# Patient Record
Sex: FEMALE | Race: WHITE | Employment: OTHER | URBAN - METROPOLITAN AREA
[De-identification: names, ages, dates, MRNs, and addresses within clinical notes are randomized per-mention and may not be internally consistent; named-entity substitution may affect disease eponyms.]

---

## 2018-01-03 PROBLEM — M12.811 ROTATOR CUFF TEAR ARTHROPATHY, RIGHT: Status: ACTIVE | Noted: 2018-01-03

## 2018-01-03 PROBLEM — M75.101 ROTATOR CUFF TEAR ARTHROPATHY, RIGHT: Status: ACTIVE | Noted: 2018-01-03

## 2018-01-03 NOTE — BRIEF OP NOTE
BRIEF OPERATIVE NOTE    Date of Procedure: 1/5/2018     Preoperative Diagnosis:  ROTATOR CUFF TEAR ARTHROPATHY RIGHT SHOULDER  [M12.811]    Postoperative Diagnosis:  SAME    Procedure(s): REVERSE RIGHT TOTAL SHOULDER ARTHROPLASTY WITH DELTA EXTEND PROSTHESIS, BICEPS TENODESIS, LATISSIMUS DORSI AND TERES MAJOR TENDON TRANSFER RIGHT SHOULDER    Surgeon(s) and Role:     * Antoinette Prater MD - Primary           Anesthesia: General WITH INTERSCALENE BLOCK    Complications: NONE    Implants:     Implant Name Type Inv.  Item Serial No.  Lot No. LRB No. Used Action   CEMENT BNE SIMPLEX TOBRA 4 --  - [id number]  CEMENT BNE SIMPLEX TOBRA 4 --  [id number] [employer] [facility] [id number] Right 1 Implanted   SCR BNE LCK GLENOID 4.5X48MM -- DELTA EXTEND - [id number]  SCR BNE LCK GLENOID 4.5X48MM -- DELTA EXTEND [id number] [facility] [employer] [id number] Right 1 Implanted   COMPNT GLENOSPHERE ECC 38MM -- DELTA EXTEND - [id number]  COMPNT GLENOSPHERE ECC 38MM -- DELTA EXTEND [id number] [facility] [employer] [id number] Right 1 Implanted   COMPNT LUIS METAGLENE -- DELTA EXTEND - [id number]  COMPNT LUIS METAGLENE -- DELTA EXTEND [id number] [facility] [employer] [id number] Right 1 Implanted   RSTRCTR IVAN BNE BIOABSRB 8MM -- Alma Stephen [id number]  RSTRCTR IVAN BNE BIOABSRB 8MM -- Zita Willis [id number] [facility] [employer] [id number] Right 1 Implanted   STEM HUM MONO STND SZ1 8MM -- DELTA XTEND - [id number]  STEM HUM MONO STND SZ1 8MM -- DELTA XTEND [id number] [facility] [employer] [id number] Right 1 Implanted   CUP HUM LAT POLY [id number] -- DELTA Rinda Atmore - [id number]  CUP HUM LAT POLY [id number] -- DELTA XTEND [id number] [facility] [employer] [id number] Right 1 Implanted   SCR BNE CRTX ST 4.5X48MM SS --  - [id number]   SCR BNE CRTX ST 4.5X48MM SS --  [id number] [facility] [id number] Right 1 Implanted        Antoinette Prater MD

## 2018-01-03 NOTE — H&P
[facility] HISTORY AND PHYSICAL    Subjective:     Patient is a 79 y.o. RHD FEMALE WITH RIGHT SHOULDER PAIN. SEE OFFICE NOTE. Patient Active Problem List    Diagnosis Date Noted    Rotator cuff tear arthropathy, right 01/03/2018     No past medical history on file. No past surgical history on file. Prior to Admission medications    Not on File     Allergies not on file   Social History   Substance Use Topics    Smoking status: Not on file    Smokeless tobacco: Not on file    Alcohol use Not on file      No family history on file. Review of Systems  A comprehensive review of systems was negative except for that written in the HPI. Objective:     No data found. There were no vitals taken for this visit. General:  Alert, cooperative, no distress, appears stated age. Head:  Normocephalic, without obvious abnormality, atraumatic. Back:   Symmetric, no curvature. ROM normal. No CVA tenderness. Lungs:   Clear to auscultation bilaterally. Chest wall:  No tenderness or deformity. Heart:  Regular rate and rhythm, S1, S2 normal, no murmur, click, rub or gallop. Extremities: Extremities normal, atraumatic, no cyanosis or edema. Pulses: 2+ and symmetric all extremities. Skin: Skin color, texture, turgor normal. No rashes or lesions. Lymph nodes: Cervical, supraclavicular, and axillary nodes normal.   Neurologic: CNII-XII intact. Normal strength, sensation and reflexes throughout. Assessment:     Principal Problem:    Rotator cuff tear arthropathy, right (1/3/2018)        Plan:     The various methods of treatment have been discussed with the patient and family. PATIENT HAS EXHAUSTED NON-OPERATIVE MODALITIES. After consideration of risks, benefits and other options for treatment, the patient has consented to surgical intervention. SEE OFFICE NOTE.     Duane Dolphin., MD

## 2018-01-04 ENCOUNTER — HOSPITAL ENCOUNTER (OUTPATIENT)
Dept: SURGERY | Age: 68
Discharge: HOME | DRG: 483 | End: 2018-01-04
Attending: ORTHOPAEDIC SURGERY
Payer: MEDICARE

## 2018-01-04 ENCOUNTER — ANESTHESIA EVENT (OUTPATIENT)
Dept: SURGERY | Age: 68
DRG: 483 | End: 2018-01-04
Payer: MEDICARE

## 2018-01-04 VITALS
WEIGHT: 121 LBS | TEMPERATURE: 98.1 F | SYSTOLIC BLOOD PRESSURE: 142 MMHG | HEART RATE: 61 BPM | HEIGHT: 61 IN | DIASTOLIC BLOOD PRESSURE: 81 MMHG | RESPIRATION RATE: 16 BRPM | BODY MASS INDEX: 22.84 KG/M2 | OXYGEN SATURATION: 97 %

## 2018-01-04 LAB
ALBUMIN SERPL-MCNC: 4.5 G/DL (ref 3.2–4.6)
ALBUMIN/GLOB SERPL: 1.3 {RATIO} (ref 1.2–3.5)
ALP SERPL-CCNC: 256 U/L (ref 50–136)
ALT SERPL-CCNC: 105 U/L (ref 12–65)
ANION GAP SERPL CALC-SCNC: 12 MMOL/L (ref 7–16)
APPEARANCE UR: ABNORMAL
APTT PPP: 32.5 SEC (ref 23.2–35.3)
AST SERPL-CCNC: 63 U/L (ref 15–37)
ATRIAL RATE: 77 BPM
BACTERIA SPEC CULT: ABNORMAL
BACTERIA URNS QL MICRO: ABNORMAL /HPF
BILIRUB SERPL-MCNC: 0.8 MG/DL (ref 0.2–1.1)
BILIRUB UR QL: NEGATIVE
BUN SERPL-MCNC: 19 MG/DL (ref 8–23)
CALCIUM SERPL-MCNC: 10.2 MG/DL (ref 8.3–10.4)
CALCULATED P AXIS, ECG09: 68 DEGREES
CALCULATED R AXIS, ECG10: 47 DEGREES
CALCULATED T AXIS, ECG11: 32 DEGREES
CASTS URNS QL MICRO: ABNORMAL /LPF
CHLORIDE SERPL-SCNC: 103 MMOL/L (ref 98–107)
CO2 SERPL-SCNC: 26 MMOL/L (ref 21–32)
COLOR UR: YELLOW
CREAT SERPL-MCNC: 0.74 MG/DL (ref 0.6–1)
DIAGNOSIS, 93000: NORMAL
EPI CELLS #/AREA URNS HPF: ABNORMAL /HPF
ERYTHROCYTE [DISTWIDTH] IN BLOOD BY AUTOMATED COUNT: 12.9 % (ref 11.9–14.6)
GLOBULIN SER CALC-MCNC: 3.4 G/DL (ref 2.3–3.5)
GLUCOSE SERPL-MCNC: 81 MG/DL (ref 65–100)
GLUCOSE UR STRIP.AUTO-MCNC: NEGATIVE MG/DL
HCT VFR BLD AUTO: 41.6 % (ref 35.8–46.3)
HGB BLD-MCNC: 13.7 G/DL (ref 11.7–15.4)
HGB UR QL STRIP: ABNORMAL
INR PPP: 0.9
KETONES UR QL STRIP.AUTO: NEGATIVE MG/DL
LEUKOCYTE ESTERASE UR QL STRIP.AUTO: ABNORMAL
MAGNESIUM SERPL-MCNC: 2.1 MG/DL (ref 1.8–2.4)
MCH RBC QN AUTO: 30.1 PG (ref 26.1–32.9)
MCHC RBC AUTO-ENTMCNC: 32.9 G/DL (ref 31.4–35)
MCV RBC AUTO: 91.4 FL (ref 79.6–97.8)
NITRITE UR QL STRIP.AUTO: POSITIVE
P-R INTERVAL, ECG05: 174 MS
PH UR STRIP: 5 [PH] (ref 5–9)
PLATELET # BLD AUTO: 240 K/UL (ref 150–450)
PMV BLD AUTO: 10.7 FL (ref 10.8–14.1)
POTASSIUM SERPL-SCNC: 3.3 MMOL/L (ref 3.5–5.1)
PROT SERPL-MCNC: 7.9 G/DL (ref 6.3–8.2)
PROT UR STRIP-MCNC: NEGATIVE MG/DL
PROTHROMBIN TIME: 12.7 SEC (ref 11.5–14.5)
Q-T INTERVAL, ECG07: 436 MS
QRS DURATION, ECG06: 72 MS
QTC CALCULATION (BEZET), ECG08: 493 MS
RBC # BLD AUTO: 4.55 M/UL (ref 4.05–5.25)
RBC #/AREA URNS HPF: ABNORMAL /HPF
SERVICE CMNT-IMP: ABNORMAL
SODIUM SERPL-SCNC: 141 MMOL/L (ref 136–145)
SP GR UR REFRACTOMETRY: 1.02 (ref 1–1.02)
UROBILINOGEN UR QL STRIP.AUTO: 0.2 EU/DL (ref 0.2–1)
VENTRICULAR RATE, ECG03: 77 BPM
WBC # BLD AUTO: 6.6 K/UL (ref 4.3–11.1)
WBC URNS QL MICRO: ABNORMAL /HPF

## 2018-01-04 PROCEDURE — 80053 COMPREHEN METABOLIC PANEL: CPT | Performed by: ORTHOPAEDIC SURGERY

## 2018-01-04 PROCEDURE — 87086 URINE CULTURE/COLONY COUNT: CPT | Performed by: FAMILY MEDICINE

## 2018-01-04 PROCEDURE — 81001 URINALYSIS AUTO W/SCOPE: CPT | Performed by: ORTHOPAEDIC SURGERY

## 2018-01-04 PROCEDURE — 36415 COLL VENOUS BLD VENIPUNCTURE: CPT | Performed by: ORTHOPAEDIC SURGERY

## 2018-01-04 PROCEDURE — 85610 PROTHROMBIN TIME: CPT | Performed by: ORTHOPAEDIC SURGERY

## 2018-01-04 PROCEDURE — 87641 MR-STAPH DNA AMP PROBE: CPT | Performed by: ORTHOPAEDIC SURGERY

## 2018-01-04 PROCEDURE — 86920 COMPATIBILITY TEST SPIN: CPT | Performed by: ORTHOPAEDIC SURGERY

## 2018-01-04 PROCEDURE — 86905 BLOOD TYPING RBC ANTIGENS: CPT | Performed by: ORTHOPAEDIC SURGERY

## 2018-01-04 PROCEDURE — 93005 ELECTROCARDIOGRAM TRACING: CPT | Performed by: ORTHOPAEDIC SURGERY

## 2018-01-04 PROCEDURE — 86870 RBC ANTIBODY IDENTIFICATION: CPT | Performed by: ORTHOPAEDIC SURGERY

## 2018-01-04 PROCEDURE — 85027 COMPLETE CBC AUTOMATED: CPT | Performed by: ORTHOPAEDIC SURGERY

## 2018-01-04 PROCEDURE — 87186 SC STD MICRODIL/AGAR DIL: CPT | Performed by: FAMILY MEDICINE

## 2018-01-04 PROCEDURE — 83735 ASSAY OF MAGNESIUM: CPT | Performed by: ORTHOPAEDIC SURGERY

## 2018-01-04 PROCEDURE — 85730 THROMBOPLASTIN TIME PARTIAL: CPT | Performed by: ORTHOPAEDIC SURGERY

## 2018-01-04 PROCEDURE — 87088 URINE BACTERIA CULTURE: CPT | Performed by: FAMILY MEDICINE

## 2018-01-04 PROCEDURE — 86900 BLOOD TYPING SEROLOGIC ABO: CPT | Performed by: ORTHOPAEDIC SURGERY

## 2018-01-04 PROCEDURE — 86902 BLOOD TYPE ANTIGEN DONOR EA: CPT | Performed by: ORTHOPAEDIC SURGERY

## 2018-01-04 RX ORDER — SODIUM CHLORIDE 9 MG/ML
250 INJECTION, SOLUTION INTRAVENOUS AS NEEDED
Status: CANCELLED | OUTPATIENT
Start: 2018-01-04

## 2018-01-04 RX ORDER — ERGOCALCIFEROL 1.25 MG/1
50000 CAPSULE ORAL
Status: ON HOLD | COMMUNITY
End: 2018-01-05

## 2018-01-04 RX ORDER — POTASSIUM CHLORIDE 20 MEQ/1
20 TABLET, EXTENDED RELEASE ORAL 2 TIMES DAILY
COMMUNITY

## 2018-01-04 RX ORDER — PRAMIPEXOLE DIHYDROCHLORIDE 0.25 MG/1
0.5 TABLET ORAL 3 TIMES DAILY
COMMUNITY

## 2018-01-04 RX ORDER — LEVOTHYROXINE SODIUM 50 UG/1
50 TABLET ORAL
COMMUNITY

## 2018-01-04 RX ORDER — HYDROCORTISONE 5 MG/1
TABLET ORAL 2 TIMES DAILY
COMMUNITY

## 2018-01-04 NOTE — PERIOP NOTES
Patient verified name, , and surgery as listed in [facility]. Type 3 surgery, Walk in assessment complete. Labs per surgeon: cbc,cmp,pt,ptt,mag,ua,mrsa swab, T&C; results (processing)   Unable to obtain blood specimen in PAT - pt brought to lab for collection. Labs per anesthesia protocol: included in surgeon's orders  EKG: done today - will have MDA review. Most recent endocrine note (17), pcp note (17), echo (17) in EMR under Care Everywhere tab if needed DOS. Hibiclens and instructions given per hospital policy. Patient provided with and instructed on educational handouts including Guide to Surgery, Pain Management, Hand Hygiene, Blood Transfusion Education, and [facility] Brochure. Patient answered medical/surgical history questions at their best of ability. All prior to admission medications documented in [facility]. Original medication prescription bottle not visualized during patient appointment. Patient instructed to hold all vitamins 7 days prior to surgery and NSAIDS 5 days prior to surgery, patient verbalized understanding. Medications to be held: ibuprofen    Patient instructed to continue previous medications as prescribed prior to surgery and to take the following medications the day of surgery according to anesthesia guidelines with a small sip of water: cortef, synthroid, mirapex. Patient teach back successful and patient demonstrates knowledge of instructions.

## 2018-01-04 NOTE — PERIOP NOTES
Message left for Latanya Winston for , with abnormal labs from today and copy faxed for review.     Recent Results (from the past 12 hour(s))   EKG, 12 LEAD, INITIAL    Collection Time: 01/04/18  1:02 PM   Result Value Ref Range    Ventricular Rate 77 BPM    Atrial Rate 77 BPM    P-R Interval 174 ms    QRS Duration 72 ms    Q-T Interval 436 ms    QTC Calculation (Bezet) 493 ms    Calculated P Axis 68 degrees    Calculated R Axis 47 degrees    Calculated T Axis 32 degrees    Diagnosis       Sinus rhythm with Premature atrial complexes  Low voltage QRS  Nonspecific ST abnormality  Abnormal ECG  No previous ECGs available  Confirmed by Mary Kate Miller MD (), LULU RENTERIA ([id number]) on 1/4/2018 2:06:14 PM     URINALYSIS W/ RFLX MICROSCOPIC    Collection Time: 01/04/18  1:06 PM   Result Value Ref Range    Color YELLOW      Appearance CLOUDY      Specific gravity 1.020 1.001 - 1.023      pH (UA) 5.0 5.0 - 9.0      Protein NEGATIVE  NEG mg/dL    Glucose NEGATIVE  mg/dL    Ketone NEGATIVE  NEG mg/dL    Bilirubin NEGATIVE  NEG      Blood TRACE (A) NEG      Urobilinogen 0.2 0.2 - 1.0 EU/dL    Nitrites POSITIVE (A) NEG      Leukocyte Esterase SMALL (A) NEG      WBC 10-20 0 /hpf    RBC 0-3 0 /hpf    Epithelial cells 0-3 0 /hpf    Bacteria 4+ (H) 0 /hpf    Casts 3-5 0 /lpf   CBC W/O DIFF    Collection Time: 01/04/18  1:15 PM   Result Value Ref Range    WBC 6.6 4.3 - 11.1 K/uL    RBC 4.55 4.05 - 5.25 M/uL    HGB 13.7 11.7 - 15.4 g/dL    HCT 41.6 35.8 - 46.3 %    MCV 91.4 79.6 - 97.8 FL    MCH 30.1 26.1 - 32.9 PG    MCHC 32.9 31.4 - 35.0 g/dL    RDW 12.9 11.9 - 14.6 %    PLATELET 277 364 - 920 K/uL    MPV 10.7 (L) 10.8 - 30.5 FL   METABOLIC PANEL, COMPREHENSIVE    Collection Time: 01/04/18  1:15 PM   Result Value Ref Range    Sodium 141 136 - 145 mmol/L    Potassium 3.3 (L) 3.5 - 5.1 mmol/L    Chloride 103 98 - 107 mmol/L    CO2 26 21 - 32 mmol/L    Anion gap 12 7 - 16 mmol/L    Glucose 81 65 - 100 mg/dL    BUN 19 8 - 23 MG/DL Creatinine 0.74 0.6 - 1.0 MG/DL    GFR est AA >60 >60 ml/min/1.73m2    GFR est non-AA >60 >60 ml/min/1.73m2    Calcium 10.2 8.3 - 10.4 MG/DL    Bilirubin, total 0.8 0.2 - 1.1 MG/DL    ALT (SGPT) 105 (H) 12 - 65 U/L    AST (SGOT) 63 (H) 15 - 37 U/L    Alk.  phosphatase 256 (H) 50 - 136 U/L    Protein, total 7.9 6.3 - 8.2 g/dL    Albumin 4.5 3.2 - 4.6 g/dL    Globulin 3.4 2.3 - 3.5 g/dL    A-G Ratio 1.3 1.2 - 3.5     MAGNESIUM    Collection Time: 01/04/18  1:15 PM   Result Value Ref Range    Magnesium 2.1 1.8 - 2.4 mg/dL   PROTHROMBIN TIME + INR    Collection Time: 01/04/18  1:15 PM   Result Value Ref Range    Prothrombin time 12.7 11.5 - 14.5 sec    INR 0.9     PTT    Collection Time: 01/04/18  1:15 PM   Result Value Ref Range    aPTT 32.5 23.2 - 35.3 SEC

## 2018-01-04 NOTE — PERIOP NOTES
Natha Bumpers in medical records @ [facility] ([phone]) faxing last 2 EKG's to [phone].

## 2018-01-04 NOTE — PERIOP NOTES
, anesthesia, reviewed pt chart including ekg's (1/4/18, 3/16/17, 2/22/17) and endocrine note (11/29/17). Ordered stress dose steroids for DOS.

## 2018-01-04 NOTE — PERIOP NOTES
Received call from lab, tech reporting that type and cross screening has indicated pt is (+) for antibodies and  Blood will require a longer time period for processing and cross matching for pt. Called pre op, reported information to Adrian Marino RN.

## 2018-01-05 ENCOUNTER — APPOINTMENT (OUTPATIENT)
Dept: GENERAL RADIOLOGY | Age: 68
DRG: 483 | End: 2018-01-05
Attending: ORTHOPAEDIC SURGERY
Payer: MEDICARE

## 2018-01-05 ENCOUNTER — HOSPITAL ENCOUNTER (INPATIENT)
Age: 68
LOS: 3 days | Discharge: HOME HEALTH | DRG: 483 | End: 2018-01-08
Attending: ORTHOPAEDIC SURGERY | Admitting: ORTHOPAEDIC SURGERY
Payer: MEDICARE

## 2018-01-05 ENCOUNTER — ANESTHESIA (OUTPATIENT)
Dept: SURGERY | Age: 68
DRG: 483 | End: 2018-01-05
Payer: MEDICARE

## 2018-01-05 PROBLEM — M12.811 ROTATOR CUFF TEAR ARTHROPATHY OF RIGHT SHOULDER: Status: ACTIVE | Noted: 2018-01-05

## 2018-01-05 PROBLEM — N39.0 URINARY TRACT INFECTION: Status: ACTIVE | Noted: 2018-01-05

## 2018-01-05 PROBLEM — D64.9 ANEMIA: Status: ACTIVE | Noted: 2018-01-05

## 2018-01-05 PROBLEM — E03.9 ACQUIRED HYPOTHYROIDISM: Status: ACTIVE | Noted: 2018-01-05

## 2018-01-05 PROBLEM — K59.00 CONSTIPATION: Status: ACTIVE | Noted: 2018-01-05

## 2018-01-05 PROBLEM — D62 ANEMIA ASSOCIATED WITH ACUTE BLOOD LOSS: Status: ACTIVE | Noted: 2018-01-05

## 2018-01-05 PROBLEM — E27.40 ADRENAL INSUFFICIENCY (HCC): Status: ACTIVE | Noted: 2018-01-05

## 2018-01-05 PROBLEM — E87.6 HYPOKALEMIA: Status: ACTIVE | Noted: 2018-01-05

## 2018-01-05 PROBLEM — M75.101 ROTATOR CUFF TEAR ARTHROPATHY OF RIGHT SHOULDER: Status: ACTIVE | Noted: 2018-01-05

## 2018-01-05 PROBLEM — R09.81 SINUS CONGESTION: Status: ACTIVE | Noted: 2018-01-05

## 2018-01-05 PROCEDURE — 77030032490 HC SLV COMPR SCD KNE COVD -B: Performed by: ORTHOPAEDIC SURGERY

## 2018-01-05 PROCEDURE — 74011250637 HC RX REV CODE- 250/637: Performed by: ORTHOPAEDIC SURGERY

## 2018-01-05 PROCEDURE — 65270000029 HC RM PRIVATE

## 2018-01-05 PROCEDURE — 77030035643 HC BLD SAW OSC PRECIS STRY -C: Performed by: ORTHOPAEDIC SURGERY

## 2018-01-05 PROCEDURE — 77030002913 HC SUT ETHBND J&J -B: Performed by: ORTHOPAEDIC SURGERY

## 2018-01-05 PROCEDURE — 77030031139 HC SUT VCRL2 J&J -A: Performed by: ORTHOPAEDIC SURGERY

## 2018-01-05 PROCEDURE — 77030008477 HC STYL SATN SLP COVD -A: Performed by: ANESTHESIOLOGY

## 2018-01-05 PROCEDURE — 74011250636 HC RX REV CODE- 250/636: Performed by: ANESTHESIOLOGY

## 2018-01-05 PROCEDURE — 74011250636 HC RX REV CODE- 250/636: Performed by: ORTHOPAEDIC SURGERY

## 2018-01-05 PROCEDURE — 74011250637 HC RX REV CODE- 250/637: Performed by: FAMILY MEDICINE

## 2018-01-05 PROCEDURE — 77030012547: Performed by: ORTHOPAEDIC SURGERY

## 2018-01-05 PROCEDURE — 77030002986 HC SUT PROL J&J -A: Performed by: ORTHOPAEDIC SURGERY

## 2018-01-05 PROCEDURE — 76010000171 HC OR TIME 2 TO 2.5 HR INTENSV-TIER 1: Performed by: ORTHOPAEDIC SURGERY

## 2018-01-05 PROCEDURE — 77030018836 HC SOL IRR NACL ICUM -A: Performed by: ORTHOPAEDIC SURGERY

## 2018-01-05 PROCEDURE — 73030 X-RAY EXAM OF SHOULDER: CPT

## 2018-01-05 PROCEDURE — 77030034849: Performed by: ORTHOPAEDIC SURGERY

## 2018-01-05 PROCEDURE — 77030020163 HC SEAL HEMSTAT HALY -B: Performed by: ORTHOPAEDIC SURGERY

## 2018-01-05 PROCEDURE — 74011250636 HC RX REV CODE- 250/636

## 2018-01-05 PROCEDURE — 76210000016 HC OR PH I REC 1 TO 1.5 HR: Performed by: ORTHOPAEDIC SURGERY

## 2018-01-05 PROCEDURE — 77030002996 HC SUT SLK J&J -A: Performed by: ORTHOPAEDIC SURGERY

## 2018-01-05 PROCEDURE — 0RRJ00Z REPLACEMENT OF RIGHT SHOULDER JOINT WITH REVERSE BALL AND SOCKET SYNTHETIC SUBSTITUTE, OPEN APPROACH: ICD-10-PCS | Performed by: ORTHOPAEDIC SURGERY

## 2018-01-05 PROCEDURE — 77030008703 HC TU ET UNCUF COVD -A: Performed by: ANESTHESIOLOGY

## 2018-01-05 PROCEDURE — 77030003602 HC NDL NRV BLK BBMI -B: Performed by: ANESTHESIOLOGY

## 2018-01-05 PROCEDURE — 77030011283 HC ELECTRD NDL COVD -A: Performed by: ORTHOPAEDIC SURGERY

## 2018-01-05 PROCEDURE — C1713 ANCHOR/SCREW BN/BN,TIS/BN: HCPCS | Performed by: ORTHOPAEDIC SURGERY

## 2018-01-05 PROCEDURE — 74011000250 HC RX REV CODE- 250: Performed by: ANESTHESIOLOGY

## 2018-01-05 PROCEDURE — 77030020782 HC GWN BAIR PAWS FLX 3M -B: Performed by: ANESTHESIOLOGY

## 2018-01-05 PROCEDURE — 74011000250 HC RX REV CODE- 250

## 2018-01-05 PROCEDURE — 77030018846 HC SOL IRR STRL H20 ICUM -A: Performed by: ORTHOPAEDIC SURGERY

## 2018-01-05 PROCEDURE — C1776 JOINT DEVICE (IMPLANTABLE): HCPCS | Performed by: ORTHOPAEDIC SURGERY

## 2018-01-05 PROCEDURE — 77030013727 HC IRR FAN PULSVC ZIMM -B: Performed by: ORTHOPAEDIC SURGERY

## 2018-01-05 PROCEDURE — 74011250637 HC RX REV CODE- 250/637: Performed by: ANESTHESIOLOGY

## 2018-01-05 PROCEDURE — 77030006776 HC BLD SAW OSC CNMD -A: Performed by: ORTHOPAEDIC SURGERY

## 2018-01-05 PROCEDURE — 77030003827 HC BIT DRL J&J -B: Performed by: ORTHOPAEDIC SURGERY

## 2018-01-05 PROCEDURE — 76060000035 HC ANESTHESIA 2 TO 2.5 HR: Performed by: ORTHOPAEDIC SURGERY

## 2018-01-05 PROCEDURE — 77030011640 HC PAD GRND REM COVD -A: Performed by: ORTHOPAEDIC SURGERY

## 2018-01-05 PROCEDURE — 94760 N-INVAS EAR/PLS OXIMETRY 1: CPT

## 2018-01-05 PROCEDURE — 77030002937 HC SUT MERS J&J -B: Performed by: ORTHOPAEDIC SURGERY

## 2018-01-05 DEVICE — SCREW BNE L48MM DIA4.5MM GLEN SHLDR METAGLENE LOK FOR DELT: Type: IMPLANTABLE DEVICE | Site: SHOULDER | Status: FUNCTIONAL

## 2018-01-05 DEVICE — CUP HUM DIA38MM +6MM OFFSET STD SHLDR POLYETH DELT XTEND: Type: IMPLANTABLE DEVICE | Site: SHOULDER | Status: FUNCTIONAL

## 2018-01-05 DEVICE — STEM HUM SZ 1 L132MM DIA8MM STD SHLDR CO CHROM HA CEM: Type: IMPLANTABLE DEVICE | Site: SHOULDER | Status: FUNCTIONAL

## 2018-01-05 DEVICE — CEMENT BNE 20ML 41GM FULL DOSE PMMA W/ TOBRA M VISC RADPQ: Type: IMPLANTABLE DEVICE | Site: SHOULDER | Status: FUNCTIONAL

## 2018-01-05 DEVICE — SCREW BNE L48MM DIA4.5MM PROX CORT TIB S STL ST LOK FULL: Type: IMPLANTABLE DEVICE | Site: SHOULDER | Status: FUNCTIONAL

## 2018-01-05 DEVICE — RESTRICTOR CEM DIA8MM UNIV FEM CNL UHMWPE BIOSTP G: Type: IMPLANTABLE DEVICE | Site: SHOULDER | Status: FUNCTIONAL

## 2018-01-05 DEVICE — IMPLANTABLE DEVICE: Type: IMPLANTABLE DEVICE | Site: SHOULDER | Status: FUNCTIONAL

## 2018-01-05 DEVICE — SPHERE GLEN DIA38MM SHLDR ECC FOR DELT XTEND REV SYS: Type: IMPLANTABLE DEVICE | Site: SHOULDER | Status: FUNCTIONAL

## 2018-01-05 RX ORDER — FENTANYL CITRATE 50 UG/ML
100 INJECTION, SOLUTION INTRAMUSCULAR; INTRAVENOUS ONCE
Status: COMPLETED | OUTPATIENT
Start: 2018-01-05 | End: 2018-01-05

## 2018-01-05 RX ORDER — HYDROCORTISONE SODIUM SUCCINATE 100 MG/2ML
100 INJECTION, POWDER, FOR SOLUTION INTRAMUSCULAR; INTRAVENOUS EVERY 8 HOURS
Status: COMPLETED | OUTPATIENT
Start: 2018-01-05 | End: 2018-01-06

## 2018-01-05 RX ORDER — LIDOCAINE HYDROCHLORIDE 10 MG/ML
0.1 INJECTION INFILTRATION; PERINEURAL AS NEEDED
Status: DISCONTINUED | OUTPATIENT
Start: 2018-01-05 | End: 2018-01-05 | Stop reason: HOSPADM

## 2018-01-05 RX ORDER — HYDROMORPHONE HYDROCHLORIDE 2 MG/ML
0.5 INJECTION, SOLUTION INTRAMUSCULAR; INTRAVENOUS; SUBCUTANEOUS
Status: DISCONTINUED | OUTPATIENT
Start: 2018-01-05 | End: 2018-01-05 | Stop reason: HOSPADM

## 2018-01-05 RX ORDER — METOPROLOL TARTRATE 5 MG/5ML
INJECTION INTRAVENOUS AS NEEDED
Status: DISCONTINUED | OUTPATIENT
Start: 2018-01-05 | End: 2018-01-05 | Stop reason: HOSPADM

## 2018-01-05 RX ORDER — NEOSTIGMINE METHYLSULFATE 1 MG/ML
INJECTION INTRAVENOUS AS NEEDED
Status: DISCONTINUED | OUTPATIENT
Start: 2018-01-05 | End: 2018-01-05 | Stop reason: HOSPADM

## 2018-01-05 RX ORDER — LEVOTHYROXINE SODIUM 50 UG/1
50 TABLET ORAL
Status: DISCONTINUED | OUTPATIENT
Start: 2018-01-06 | End: 2018-01-08 | Stop reason: HOSPADM

## 2018-01-05 RX ORDER — HYDROMORPHONE HYDROCHLORIDE 2 MG/ML
1 INJECTION, SOLUTION INTRAMUSCULAR; INTRAVENOUS; SUBCUTANEOUS
Status: DISCONTINUED | OUTPATIENT
Start: 2018-01-05 | End: 2018-01-08 | Stop reason: HOSPADM

## 2018-01-05 RX ORDER — HYDROMORPHONE HYDROCHLORIDE 4 MG/1
4 TABLET ORAL
Status: DISCONTINUED | OUTPATIENT
Start: 2018-01-05 | End: 2018-01-08 | Stop reason: HOSPADM

## 2018-01-05 RX ORDER — FAMOTIDINE 20 MG/1
20 TABLET, FILM COATED ORAL ONCE
Status: COMPLETED | OUTPATIENT
Start: 2018-01-05 | End: 2018-01-05

## 2018-01-05 RX ORDER — HYDROCORTISONE SODIUM SUCCINATE 100 MG/2ML
100 INJECTION, POWDER, FOR SOLUTION INTRAMUSCULAR; INTRAVENOUS ONCE
Status: COMPLETED | OUTPATIENT
Start: 2018-01-05 | End: 2018-01-05

## 2018-01-05 RX ORDER — EPHEDRINE SULFATE 50 MG/ML
INJECTION, SOLUTION INTRAVENOUS AS NEEDED
Status: DISCONTINUED | OUTPATIENT
Start: 2018-01-05 | End: 2018-01-05 | Stop reason: HOSPADM

## 2018-01-05 RX ORDER — SODIUM CHLORIDE, SODIUM LACTATE, POTASSIUM CHLORIDE, CALCIUM CHLORIDE 600; 310; 30; 20 MG/100ML; MG/100ML; MG/100ML; MG/100ML
75 INJECTION, SOLUTION INTRAVENOUS CONTINUOUS
Status: DISCONTINUED | OUTPATIENT
Start: 2018-01-05 | End: 2018-01-05 | Stop reason: HOSPADM

## 2018-01-05 RX ORDER — MIDAZOLAM HYDROCHLORIDE 1 MG/ML
2 INJECTION, SOLUTION INTRAMUSCULAR; INTRAVENOUS ONCE
Status: COMPLETED | OUTPATIENT
Start: 2018-01-05 | End: 2018-01-05

## 2018-01-05 RX ORDER — FACIAL-BODY WIPES
10 EACH TOPICAL DAILY PRN
Status: DISCONTINUED | OUTPATIENT
Start: 2018-01-05 | End: 2018-01-08 | Stop reason: HOSPADM

## 2018-01-05 RX ORDER — POTASSIUM CHLORIDE 20 MEQ/1
40 TABLET, EXTENDED RELEASE ORAL DAILY
Status: DISCONTINUED | OUTPATIENT
Start: 2018-01-05 | End: 2018-01-08 | Stop reason: HOSPADM

## 2018-01-05 RX ORDER — HYDROCORTISONE 5 MG/1
10 TABLET ORAL
Status: DISCONTINUED | OUTPATIENT
Start: 2018-01-06 | End: 2018-01-05

## 2018-01-05 RX ORDER — POLYETHYLENE GLYCOL 3350 17 G/17G
17 POWDER, FOR SOLUTION ORAL ONCE
Status: DISPENSED | OUTPATIENT
Start: 2018-01-05 | End: 2018-01-06

## 2018-01-05 RX ORDER — GUAIFENESIN 600 MG/1
600 TABLET, EXTENDED RELEASE ORAL
Status: DISCONTINUED | OUTPATIENT
Start: 2018-01-05 | End: 2018-01-08 | Stop reason: HOSPADM

## 2018-01-05 RX ORDER — MUPIROCIN 20 MG/G
OINTMENT TOPICAL 2 TIMES DAILY
Status: ON HOLD | COMMUNITY
Start: 2018-01-05 | End: 2018-01-05

## 2018-01-05 RX ORDER — LANOLIN ALCOHOL/MO/W.PET/CERES
1 CREAM (GRAM) TOPICAL 2 TIMES DAILY WITH MEALS
Status: DISCONTINUED | OUTPATIENT
Start: 2018-01-06 | End: 2018-01-08 | Stop reason: HOSPADM

## 2018-01-05 RX ORDER — OXYCODONE HYDROCHLORIDE 5 MG/1
10 TABLET ORAL
Status: DISCONTINUED | OUTPATIENT
Start: 2018-01-05 | End: 2018-01-05 | Stop reason: HOSPADM

## 2018-01-05 RX ORDER — DEXAMETHASONE SODIUM PHOSPHATE 100 MG/10ML
INJECTION INTRAMUSCULAR; INTRAVENOUS AS NEEDED
Status: DISCONTINUED | OUTPATIENT
Start: 2018-01-05 | End: 2018-01-05 | Stop reason: HOSPADM

## 2018-01-05 RX ORDER — HYDROGEN PEROXIDE 3 %
SOLUTION, NON-ORAL MISCELLANEOUS AS NEEDED
Status: DISCONTINUED | OUTPATIENT
Start: 2018-01-05 | End: 2018-01-05 | Stop reason: HOSPADM

## 2018-01-05 RX ORDER — SODIUM CHLORIDE 9 MG/ML
250 INJECTION, SOLUTION INTRAVENOUS AS NEEDED
Status: DISCONTINUED | OUTPATIENT
Start: 2018-01-05 | End: 2018-01-05 | Stop reason: HOSPADM

## 2018-01-05 RX ORDER — GLYCERIN ADULT
1 SUPPOSITORY, RECTAL RECTAL
Status: DISPENSED | OUTPATIENT
Start: 2018-01-05 | End: 2018-01-06

## 2018-01-05 RX ORDER — CEFAZOLIN SODIUM/WATER 2 G/20 ML
2 SYRINGE (ML) INTRAVENOUS ONCE
Status: COMPLETED | OUTPATIENT
Start: 2018-01-05 | End: 2018-01-05

## 2018-01-05 RX ORDER — OXYCODONE HYDROCHLORIDE 5 MG/1
5 TABLET ORAL
Status: DISCONTINUED | OUTPATIENT
Start: 2018-01-05 | End: 2018-01-05 | Stop reason: HOSPADM

## 2018-01-05 RX ORDER — ROCURONIUM BROMIDE 10 MG/ML
INJECTION, SOLUTION INTRAVENOUS AS NEEDED
Status: DISCONTINUED | OUTPATIENT
Start: 2018-01-05 | End: 2018-01-05 | Stop reason: HOSPADM

## 2018-01-05 RX ORDER — MIDAZOLAM HYDROCHLORIDE 1 MG/ML
2 INJECTION, SOLUTION INTRAMUSCULAR; INTRAVENOUS ONCE
Status: DISCONTINUED | OUTPATIENT
Start: 2018-01-05 | End: 2018-01-05 | Stop reason: HOSPADM

## 2018-01-05 RX ORDER — GLYCOPYRROLATE 0.2 MG/ML
INJECTION INTRAMUSCULAR; INTRAVENOUS AS NEEDED
Status: DISCONTINUED | OUTPATIENT
Start: 2018-01-05 | End: 2018-01-05 | Stop reason: HOSPADM

## 2018-01-05 RX ORDER — LIDOCAINE HYDROCHLORIDE 20 MG/ML
INJECTION, SOLUTION EPIDURAL; INFILTRATION; INTRACAUDAL; PERINEURAL AS NEEDED
Status: DISCONTINUED | OUTPATIENT
Start: 2018-01-05 | End: 2018-01-05 | Stop reason: HOSPADM

## 2018-01-05 RX ORDER — SODIUM CHLORIDE 9 MG/ML
75 INJECTION, SOLUTION INTRAVENOUS CONTINUOUS
Status: DISPENSED | OUTPATIENT
Start: 2018-01-05 | End: 2018-01-06

## 2018-01-05 RX ORDER — PROPOFOL 10 MG/ML
INJECTION, EMULSION INTRAVENOUS AS NEEDED
Status: DISCONTINUED | OUTPATIENT
Start: 2018-01-05 | End: 2018-01-05 | Stop reason: HOSPADM

## 2018-01-05 RX ORDER — DIPHENHYDRAMINE HCL 25 MG
25 CAPSULE ORAL
Status: DISCONTINUED | OUTPATIENT
Start: 2018-01-05 | End: 2018-01-08 | Stop reason: HOSPADM

## 2018-01-05 RX ORDER — ONDANSETRON 2 MG/ML
INJECTION INTRAMUSCULAR; INTRAVENOUS AS NEEDED
Status: DISCONTINUED | OUTPATIENT
Start: 2018-01-05 | End: 2018-01-05 | Stop reason: HOSPADM

## 2018-01-05 RX ORDER — SODIUM CHLORIDE 0.9 % (FLUSH) 0.9 %
5-10 SYRINGE (ML) INJECTION EVERY 8 HOURS
Status: DISCONTINUED | OUTPATIENT
Start: 2018-01-05 | End: 2018-01-08 | Stop reason: HOSPADM

## 2018-01-05 RX ORDER — SODIUM CHLORIDE 0.9 % (FLUSH) 0.9 %
5-10 SYRINGE (ML) INJECTION AS NEEDED
Status: DISCONTINUED | OUTPATIENT
Start: 2018-01-05 | End: 2018-01-08 | Stop reason: HOSPADM

## 2018-01-05 RX ORDER — POTASSIUM CHLORIDE 20 MEQ/1
20 TABLET, EXTENDED RELEASE ORAL 2 TIMES DAILY
Status: DISCONTINUED | OUTPATIENT
Start: 2018-01-05 | End: 2018-01-05

## 2018-01-05 RX ORDER — DOCUSATE SODIUM 100 MG/1
100 CAPSULE, LIQUID FILLED ORAL 2 TIMES DAILY
Status: DISCONTINUED | OUTPATIENT
Start: 2018-01-06 | End: 2018-01-08 | Stop reason: HOSPADM

## 2018-01-05 RX ORDER — PRAMIPEXOLE DIHYDROCHLORIDE 0.25 MG/1
0.5 TABLET ORAL 3 TIMES DAILY
Status: DISCONTINUED | OUTPATIENT
Start: 2018-01-05 | End: 2018-01-08 | Stop reason: HOSPADM

## 2018-01-05 RX ORDER — HYDROMORPHONE HYDROCHLORIDE 2 MG/1
2 TABLET ORAL
Status: DISCONTINUED | OUTPATIENT
Start: 2018-01-05 | End: 2018-01-08 | Stop reason: HOSPADM

## 2018-01-05 RX ORDER — BUPIVACAINE HYDROCHLORIDE AND EPINEPHRINE 5; 5 MG/ML; UG/ML
INJECTION, SOLUTION EPIDURAL; INTRACAUDAL; PERINEURAL AS NEEDED
Status: DISCONTINUED | OUTPATIENT
Start: 2018-01-05 | End: 2018-01-05 | Stop reason: HOSPADM

## 2018-01-05 RX ORDER — LEVOFLOXACIN 500 MG/1
500 TABLET, FILM COATED ORAL EVERY 24 HOURS
Status: DISCONTINUED | OUTPATIENT
Start: 2018-01-06 | End: 2018-01-08 | Stop reason: HOSPADM

## 2018-01-05 RX ORDER — CALCIUM CARBONATE 200(500)MG
400 TABLET,CHEWABLE ORAL
Status: DISCONTINUED | OUTPATIENT
Start: 2018-01-05 | End: 2018-01-08 | Stop reason: HOSPADM

## 2018-01-05 RX ORDER — TEMAZEPAM 15 MG/1
15 CAPSULE ORAL
Status: DISCONTINUED | OUTPATIENT
Start: 2018-01-05 | End: 2018-01-08 | Stop reason: HOSPADM

## 2018-01-05 RX ORDER — ACETAMINOPHEN 500 MG
1000 TABLET ORAL ONCE
Status: COMPLETED | OUTPATIENT
Start: 2018-01-05 | End: 2018-01-05

## 2018-01-05 RX ORDER — PROMETHAZINE HYDROCHLORIDE 25 MG/1
25 TABLET ORAL
Status: DISCONTINUED | OUTPATIENT
Start: 2018-01-05 | End: 2018-01-08 | Stop reason: HOSPADM

## 2018-01-05 RX ADMIN — SODIUM CHLORIDE 1000 MG: 900 INJECTION, SOLUTION INTRAVENOUS at 13:13

## 2018-01-05 RX ADMIN — ONDANSETRON 4 MG: 2 INJECTION INTRAMUSCULAR; INTRAVENOUS at 16:13

## 2018-01-05 RX ADMIN — PROPOFOL 120 MG: 10 INJECTION, EMULSION INTRAVENOUS at 14:29

## 2018-01-05 RX ADMIN — ROCURONIUM BROMIDE 50 MG: 10 INJECTION, SOLUTION INTRAVENOUS at 14:29

## 2018-01-05 RX ADMIN — SODIUM CHLORIDE, SODIUM LACTATE, POTASSIUM CHLORIDE, AND CALCIUM CHLORIDE: 600; 310; 30; 20 INJECTION, SOLUTION INTRAVENOUS at 16:34

## 2018-01-05 RX ADMIN — ROCURONIUM BROMIDE 10 MG: 10 INJECTION, SOLUTION INTRAVENOUS at 15:57

## 2018-01-05 RX ADMIN — MIDAZOLAM HYDROCHLORIDE 2 MG: 1 INJECTION, SOLUTION INTRAMUSCULAR; INTRAVENOUS at 13:39

## 2018-01-05 RX ADMIN — DEXAMETHASONE SODIUM PHOSPHATE 4 MG: 100 INJECTION INTRAMUSCULAR; INTRAVENOUS at 15:00

## 2018-01-05 RX ADMIN — LIDOCAINE HYDROCHLORIDE 0.1 ML: 10 INJECTION, SOLUTION INFILTRATION; PERINEURAL at 11:11

## 2018-01-05 RX ADMIN — FAMOTIDINE 20 MG: 20 TABLET ORAL at 10:33

## 2018-01-05 RX ADMIN — POTASSIUM CHLORIDE 40 MEQ: 20 TABLET, EXTENDED RELEASE ORAL at 21:39

## 2018-01-05 RX ADMIN — ACETAMINOPHEN 1000 MG: 500 TABLET, FILM COATED ORAL at 10:33

## 2018-01-05 RX ADMIN — HYDROCORTISONE SODIUM SUCCINATE 100 MG: 100 INJECTION, POWDER, FOR SOLUTION INTRAMUSCULAR; INTRAVENOUS at 13:33

## 2018-01-05 RX ADMIN — FENTANYL CITRATE 100 MCG: 50 INJECTION, SOLUTION INTRAMUSCULAR; INTRAVENOUS at 13:39

## 2018-01-05 RX ADMIN — NEOSTIGMINE METHYLSULFATE 3 MG: 1 INJECTION INTRAVENOUS at 16:27

## 2018-01-05 RX ADMIN — SODIUM CHLORIDE, SODIUM LACTATE, POTASSIUM CHLORIDE, AND CALCIUM CHLORIDE 75 ML/HR: 600; 310; 30; 20 INJECTION, SOLUTION INTRAVENOUS at 10:33

## 2018-01-05 RX ADMIN — EPHEDRINE SULFATE 10 MG: 50 INJECTION, SOLUTION INTRAVENOUS at 14:43

## 2018-01-05 RX ADMIN — GLYCOPYRROLATE 0.4 MG: 0.2 INJECTION INTRAMUSCULAR; INTRAVENOUS at 16:27

## 2018-01-05 RX ADMIN — HYDROMORPHONE HYDROCHLORIDE 4 MG: 4 TABLET ORAL at 21:39

## 2018-01-05 RX ADMIN — PRAMIPEXOLE DIHYDROCHLORIDE 0.5 MG: 0.25 TABLET ORAL at 21:39

## 2018-01-05 RX ADMIN — BUPIVACAINE HYDROCHLORIDE AND EPINEPHRINE 40 ML: 5; 5 INJECTION, SOLUTION EPIDURAL; INTRACAUDAL; PERINEURAL at 13:48

## 2018-01-05 RX ADMIN — LIDOCAINE HYDROCHLORIDE 60 MG: 20 INJECTION, SOLUTION EPIDURAL; INFILTRATION; INTRACAUDAL; PERINEURAL at 14:29

## 2018-01-05 RX ADMIN — METOPROLOL TARTRATE 1 MG: 5 INJECTION INTRAVENOUS at 14:54

## 2018-01-05 RX ADMIN — Medication 2 G: at 14:26

## 2018-01-05 RX ADMIN — HYDROCORTISONE SODIUM SUCCINATE 100 MG: 100 INJECTION, POWDER, FOR SOLUTION INTRAMUSCULAR; INTRAVENOUS at 21:39

## 2018-01-05 NOTE — PERIOP NOTES
Nasal swab results reviewed:   Culture result:  (A)    Final   MRSA target DNA detected, SA target DNA detected.       A positive test result does not necessarily indicate the presence of viable organisms. It is however, presumptive for the presence of MRSA or SA. Pt scheduled for surgery today (1/5/18). Will require mupirocin ointment bid post op for 5 days.

## 2018-01-05 NOTE — ANESTHESIA POSTPROCEDURE EVALUATION
Post-Anesthesia Evaluation and Assessment    Patient: Ventura Romero MRN: [id number]  SSN: [id number]    YOB: 1950  Age: 79 y.o. Sex: female       Cardiovascular Function/Vital Signs  Visit Vitals    /62    Pulse 73    Temp 37.2 °C (99 °F)    Resp 16    SpO2 96%       Patient is status post general anesthesia for Procedure(s):             REVERSE RIGHT TOTAL SHOULDER ARTHROPLASTY WITH DELTA EXTEND PROSTHESIS, BICEPS TENODESIS, LATISSIMUS DORSI AND TERES MAJOR TENDON TRANSFER RIGHT SHOULDER. Nausea/Vomiting: None    Postoperative hydration reviewed and adequate. Pain:  Pain Scale 1: Numeric (0 - 10) (01/05/18 1732)  Pain Intensity 1: 0 (01/05/18 1732)   Managed    Neurological Status:   Neuro (WDL): Exceptions to WDL (01/05/18 1732)  Neuro  Neurologic State: Drowsy (01/05/18 1732)  Orientation Level: Oriented X4 (01/05/18 1732)  Cognition: Follows commands (01/05/18 1732)  Speech: Clear (01/05/18 1732)  LUE Motor Response: Weak (01/05/18 1732)  LLE Motor Response: Weak (01/05/18 1732)  RUE Motor Response: Pharmocologically paralyzed (01/05/18 1732)  RLE Motor Response: Purposeful (01/05/18 1732)   At baseline    Mental Status and Level of Consciousness: Arousable    Pulmonary Status:   O2 Device: Nasal cannula (01/05/18 1747)   Adequate oxygenation and airway patent    Complications related to anesthesia: None    Post-anesthesia assessment completed.  No concerns    Signed By: Mart Draper MD     January 5, 2018

## 2018-01-05 NOTE — H&P
Date of Surgery Update:  Ramirez Bae was seen and examined. History and physical has been reviewed. The patient has been examined.  There have been no significant clinical changes since the completion of the originally dated History and Physical.    Signed By: Jerri Ashraf MD     January 5, 2018 10:43 AM

## 2018-01-05 NOTE — ANESTHESIA PREPROCEDURE EVALUATION
Anesthetic History     PONV          Review of Systems / Medical History  Patient summary reviewed and pertinent labs reviewed    Pulmonary                   Neuro/Psych              Cardiovascular                       GI/Hepatic/Renal     GERD: well controlled           Endo/Other      Hypothyroidism: well controlled  Arthritis     Other Findings   Comments: Panhypopituitarism with secondary adrenal insufficiency           Physical Exam    Airway  Mallampati: II  TM Distance: > 6 cm  Neck ROM: normal range of motion   Mouth opening: Normal     Cardiovascular  Regular rate and rhythm,  S1 and S2 normal,  no murmur, click, rub, or gallop  Rhythm: regular  Rate: normal         Dental    Dentition: Caps/crowns     Pulmonary  Breath sounds clear to auscultation               Abdominal         Other Findings            Anesthetic Plan    ASA: 3  Anesthesia type: general      Post-op pain plan if not by surgeon: peripheral nerve block single      Anesthetic plan and risks discussed with: Patient

## 2018-01-05 NOTE — ROUTINE PROCESS
TRANSFER - IN REPORT:    Verbal report received from [facility]) on [place]  being received from PACU(unit) for routine post - op      Report consisted of patients Situation, Background, Assessment and   Recommendations(SBAR). Information from the following report(s) SBAR, Kardex, Procedure Summary, Intake/Output, MAR and Recent Results was reviewed with the receiving nurse. Opportunity for questions and clarification was provided. Assessment completed upon patients arrival to unit and care assumed.

## 2018-01-05 NOTE — IP AVS SNAPSHOT
[address] [facility] [address] [facility] 
[phone] Patient: Leslye Childress MRN: [id number] MPN:[id number] About your hospitalization You were admitted on:  January 5, 2018 You last received care in the:  [facility] 1 You were discharged on:  January 8, 2018 Why you were hospitalized Your primary diagnosis was:  Rotator Cuff Tear Arthropathy, Right Your diagnoses also included:  Hypokalemia, Anemia, Urinary Tract Infection, Anemia Associated With Acute Blood Loss, Rotator Cuff Tear Arthropathy Of Right Shoulder, Adrenal Insufficiency (Hcc), Acquired Hypothyroidism, Constipation, Sinus Congestion Follow-up Information Follow up With Details Comments Contact Info Gregory Darby MD  As needed [address] [facility] [address] [place] [address] 
[phone] Alexandrea Stephen MD In 10 days Call office if not already scheduled [facility] 10 [address]  Will contact you within 48 hrs [facility] [place] [address] 
[phone] Discharge Orders None A check levi indicates which time of day the medication should be taken. My Medications ASK your doctor about these medications Instructions Each Dose to Equal  
 Morning Noon Evening Bedtime CORTEF 5 mg tablet Generic drug:  hydrocortisone Your next dose is: Today Take  by mouth two (2) times a day. Takes 10 mg in AM and 5 mg about 5:00 pm   Take / use AM day of surgery  per anesthesia protocols. IBUPROFEN PO Your next dose is: Today Take  by mouth as needed. KLOR-CON M20 20 mEq tablet Generic drug:  potassium chloride Your next dose is: Today Take 20 mEq by mouth two (2) times a day. 20 mEq MIRAPEX 0.25 mg tablet Generic drug:  pramipexole Your next dose is: Today Take 0.5 mg by mouth three (3) times daily. Take / use AM day of surgery  per anesthesia protocols. 0.5 mg  
    
   
   
  
   
  
 mupirocin 2 % ointment Commonly known as:  [facility] Your next dose is:  Complete after 10 doses. by Both Nostrils route two (2) times a day. Indications: METHICILLIN-RESISTANT S. AUREUS NASAL COLONIZATION  
     
   
   
   
  
 synthroid 50 mcg tablet Generic drug:  levothyroxine Your next dose is:  Tomorrow Take 50 mcg by mouth Daily (before breakfast). Take / use AM day of surgery  per anesthesia protocols. 50 mcg VITAMIN D2 50,000 unit capsule Generic drug:  ergocalciferol Your next dose is:  As scheduled Take 50,000 Units by mouth every seven (7) days. Last dose 1/3/18  
 [address]74 Units Discharge Instructions DISCHARGE SUMMARY from Nurse The following personal items collected during your admission are returned to you:  
Dental Appliance: Dental Appliances: Lowers; Other (comment) (bridge ) Vision: Visual Aid: Glasses Hearing Aid:   na 
Jewelry: Jewelry: None Clothing: Clothing: At bedside Other Valuables: Other Valuables: None Valuables sent to safe:   na 
 
 
 
 
PATIENT INSTRUCTIONS: 
 
New Medications: 
 
Dilaudid 2 mg tabs Take 1-2 tabs every 4-6 hrs as needed for pain. Phenergan 25 mg tabs Take 1 tab every 8 hrs as needed for nausea. Restoril 15 mg tabs Take 1 tab at bedtime as needed for sleep. Levaquin 500 mg tabs Take 1 a day x 10 days. After general anesthesia or intravenous sedation, for 24 hours or while taking prescription Narcotics: · Limit your activities · Do not drive and operate hazardous machinery · Do not make important personal or business decisions · Do  not drink alcoholic beverages · If you have not urinated within 8 hours after discharge, please contact your surgeon on call. Report the following to your surgeon: 
· Excessive pain, swelling, redness or odor of or around the surgical area · Temperature over 101 · Nausea and vomiting lasting longer than 4 hours or if unable to take medications · Any signs of decreased circulation or nerve impairment to extremity: change in color, persistent  numbness, tingling, coldness or increase pain · Any questions, call office @ [phone]. What to do at Home: 
Recommended activity: activity as tolerated, as instructed per Dr. Amy Eaton. Continue with exercises taught by Physical Therapy. Resume per hospital diet. Wear sling to right arm. Use ice and elevate arm to decrease pain and swelling. If you experience any of the following symptoms temp>101, pain unrelieved by meds, or persistent nausea or vomitting, please follow up with Dr. Amy Eaton @ [phone]. *  Please give a list of your current medications to your Primary Care Provider. *  Please update this list whenever your medications are discontinued, doses are 
    changed, or new medications (including over-the-counter products) are added. *  Please carry medication information at all times in case of emergency situations. Shoulder Replacement Surgery: What to Expect at Home Your Recovery Shoulder replacement surgery replaces the worn parts of your shoulder joint. When you leave the hospital, your arm will be in a sling. It will be helpful if there is someone to help you at home for the next few weeks or until you have more energy and can move around better. You will go home with a bandage and stitches or staples. You can remove the bandage when your doctor tells you to. If the stitches are not the type that dissolve, your doctor will remove them in 10 to 14 days. You may still have some mild pain, and the area may be swollen for several months after surgery. Your doctor will give you medicine for the pain. You will continue the rehabilitation program (rehab) you started in the hospital. The better you do with your rehab exercises, the sooner you will get your strength and movement back. Depending on your job, you may be able to return to work as early as 2 to 3 weeks after surgery, as long as you avoid certain arm movements, such as lifting. This care sheet gives you a general idea about how long it will take for you to recover. But each person recovers at a different pace. Follow the steps below to get better as quickly as possible. How can you care for yourself at home? Activity · Rest when you feel tired. You may take a nap, but don't stay in bed all day. · Work with your physical therapist to learn the best way to exercise. · You will have a sling to wear at night. And it's a good idea to also put a small stack of folded sheets or towels under your upper arm while you are in bed to keep your arm from dropping too far back. · Your arm should stay next to your body or in front of it for several weeks, both while you are up and during sleep. · Don't lift anything with the affected arm for 6 weeks. · You may need to take sponge baths until your stitches or staples have been removed. You will probably be able to shower 24 hours after they are removed. · Ask your doctor when you can drive again. · Ask your doctor when it is okay for you to have sex. · Your doctor may advise you to give up activities that put stress on that shoulder. This includes sports such as weight lifting or tennis, unless your tennis arm was not the one affected. Diet · By the time you leave the hospital, you will probably be eating your normal diet. If your stomach is upset, try bland, low-fat foods like plain rice, broiled chicken, toast, and yogurt. Your doctor may recommend that you take iron and vitamin supplements. · Drink plenty of fluids (unless your doctor tells you not to). · You may notice that your bowel movements are not regular right after your surgery. This is common. Try to avoid constipation and straining with bowel movements. You may want to take a fiber supplement every day. If you have not had a bowel movement after a couple of days, ask your doctor about taking a mild laxative. Medicines · Be safe with medicines. Take pain medicines exactly as directed. ¨ If the doctor gave you a prescription medicine for pain, take it as prescribed. ¨ If you are not taking a prescription pain medicine, ask your doctor if you can take an over-the-counter medicine. · If you think your pain medicine is making you sick to your stomach: 
¨ Take your medicine after meals (unless your doctor has told you not to). ¨ Ask your doctor for a different pain medicine. · If your doctor prescribed antibiotics, take them as directed. Don't stop taking them just because you feel better. You need to take the full course of antibiotics. · If you take a blood thinner, be sure you get instructions about how to take your medicine safely. Blood thinners can cause serious bleeding problems. Incision care · You will have a dressing over the cut (incision). A dressing helps the incision heal and protects it. Your doctor will tell you how to take care of this. · Keep the area clean and dry. Exercise · Shoulder rehabilitation is a series of exercises you do after your surgery. This helps you get back your shoulder's range of motion and strength. You will work with your doctor and physical therapist to plan this exercise program. To get the best results, you need to do the exercises correctly and as often and as long as your doctor tells you. Ice · For pain, put ice or a cold pack on the area for 10 to 20 minutes at a time. Put a thin cloth between the ice and your skin. Other instructions · Wear medical alert jewelry that says you may need antibiotics before any procedure, including dental work. You can buy this at most drugstores. Follow-up care is a key part of your treatment and safety. Be sure to make and go to all appointments, and call your doctor if you are having problems. It's also a good idea to know your test results and keep a list of the medicines you take. When should you call for help? Call 911 anytime you think you may need emergency care. For example, call if: 
· You have severe trouble breathing. · You have symptoms of a blood clot in your lung (called a pulmonary embolism). These may include: 
¨ Sudden chest pain. ¨ Trouble breathing. ¨ Coughing up blood. Call your doctor now or seek immediate medical care if: 
· You have severe or increasing pain. · You have symptoms of infection, such as: 
¨ Increased pain, swelling, warmth, or redness. ¨ Red streaks or pus. ¨ A fever. · You have tingling, weakness, or numbness in your arm. · Your arm turns cold or changes color. · You have symptoms of a blood clot in your leg (called a deep vein thrombosis). These may include: 
¨ Pain in the calf, back of the knee, thigh, or groin. ¨ Redness and swelling in the leg or groin. Watch closely for changes in your health, and be sure to contact your doctor if: 
· You do not get better as expected. Where can you learn more? Go to Dashwire.be Enter O387 in the search box to learn more about \"Shoulder Replacement Surgery: What to Expect at Home. \"  
© 0237-3423 Healthwise, Incorporated. Care instructions adapted under license by Tanisha Roberts (which disclaims liability or warranty for this information). This care instruction is for use with your licensed healthcare professional. If you have questions about a medical condition or this instruction, always ask your healthcare professional. [address] any warranty or liability for your use of this information. Content Version: 2.4.150842; Last Revised: August 6, 2013 These are general instructions for a healthy lifestyle: No smoking/ No tobacco products/ Avoid exposure to second hand smoke Surgeon General's Warning:  Quitting smoking now greatly reduces serious risk to your health. Obesity, smoking, and sedentary lifestyle greatly increases your risk for illness A healthy diet, regular physical exercise & weight monitoring are important for maintaining a healthy lifestyle You may be retaining fluid if you have a history of heart failure or if you experience any of the following symptoms:  Weight gain of 3 pounds or more overnight or 5 pounds in a week, increased swelling in our hands or feet or shortness of breath while lying flat in bed. Please call your doctor as soon as you notice any of these symptoms; do not wait until your next office visit. Recognize signs and symptoms of STROKE: 
 
F-face looks uneven A-arms unable to move or move unevenly S-speech slurred or non-existent T-time-call 911 as soon as signs and symptoms begin-DO NOT go Back to bed or wait to see if you get better-TIME IS BRAIN. The discharge information has been reviewed with the patient. The patient verbalized understanding. ACO Transitions of Care Introducing [employer] [address] Angelica Jose offers a voluntary care coordination program to provide high quality service and care to [facility] fee-for-service beneficiaries. Milagros Rangel was designed to help you enhance your health and well-being through the following services: ? Transitions of Care  support for individuals who are transitioning from one care setting to another (example: Hospital to home). ?  Chronic and Complex Care Coordination  support for individuals and caregivers of those with serious or chronic illnesses or with more than one chronic (ongoing) condition and those who take a number of different medications. If you meet specific medical criteria, a [facility] may call you directly to coordinate your care with your primary care physician and your other care providers. For questions about the [facility] programs, please, contact your physicians office. For general questions or additional information about Accountable Care Organizations: 
Please visit www.medicare.gov/acos. html or call 1-800-MEDICARE (0-500.227.7334) TTY users should call 3-136.473.3642. [employer] Announcement We are excited to announce that we are making your provider's discharge notes available to you in GenieTown. You will see these notes when they are completed and signed by the physician that discharged you from your recent hospital stay. If you have any questions or concerns about any information you see in GenieTown, please call the Health Information Department where you were seen or reach out to your Primary Care Provider for more information about your plan of care. Introducing [facility]! Naga Finn introduces [employer] patient portal. Now you can access parts of your medical record, email your doctor's office, and request medication refills online. 1. In your internet browser, go to [email or web address] 2. Click on the First Time User? Click Here link in the Sign In box. You will see the New Member Sign Up page. 3. Enter your [employer] Access Code exactly as it appears below. You will not need to use this code after youve completed the sign-up process. If you do not sign up before the expiration date, you must request a new code. · [employer] Access Code: [email or web address] Expires: 4/3/2018  2:37 PM 
 
4. Enter the last four digits of your Social Security Number (xxxx) and Date of Birth (mm/dd/yyyy) as indicated and click Submit.  You will be taken to the next sign-up page. 5. Create a [employer] ID. This will be your [employer] login ID and cannot be changed, so think of one that is secure and easy to remember. 6. Create a [employer] password. You can change your password at any time. 7. Enter your Password Reset Question and Answer. This can be used at a later time if you forget your password. 8. Enter your e-mail address. You will receive e-mail notification when new information is available in [address] 9. Click Sign Up. You can now view and download portions of your medical record. 10. Click the Download Summary menu link to download a portable copy of your medical information. If you have questions, please visit the Frequently Asked Questions section of the [employer] website. Remember, [employer] is NOT to be used for urgent needs. For medical emergencies, dial 911. Now available from your iPhone and Android! Providers Seen During Your Hospitalization Provider Specialty Primary office phone Danny Price MD Orthopedic Surgery [phone] Your Primary Care Physician (PCP) Primary Care Physician Office Phone Office Fax Nancyjasbir Cardonavers [phone] You are allergic to the following Allergen Reactions Pcn (Penicillins) Rash  
    
 Sulfa (Sulfonamide Antibiotics) Rash Recent Documentation Smoking Status Never Smoker Emergency Contacts Name Discharge Info Relation Home Work Mobile Sandro Reagan) DISCHARGE CAREGIVER [3] Spouse [3] [phone] Patient Belongings The following personal items are in your possession at time of discharge: 
  Dental Appliances: None  Visual Aid: Contacts (left eye only)      Home Medications: None   Jewelry: None  Clothing: None    Other Valuables: None Please provide this summary of care documentation to your next provider. Signatures-by signing, you are acknowledging that this After Visit Summary has been reviewed with you and you have received a copy. Patient Signature:  ____________________________________________________________ Date:  ____________________________________________________________  
  
Laverne Sleeper Provider Signature:  ____________________________________________________________ Date:  ____________________________________________________________

## 2018-01-05 NOTE — PHYSICIAN ADVISORY
Letter of Determination: Inpatient Status Appropriate    This patient was originally hospitalized as Inpatient on 1/5/2018 for scheduled right shoulder rotater cuff repair. This patient is appropriate for Inpatient Admission in accordance with CMS regulation Section 43 .3 and the Inpatient Only List.     It is our recommendation that this patient's hospitalization status should be INPATIENT status.      The final decision regarding the patient's hospitalization status depends on the attending physician's judgement.       Karl Taylor MD, JENNIFER,   Physician [place].

## 2018-01-05 NOTE — PERIOP NOTES
TRANSFER - OUT REPORT:    Verbal report given to [facility] RN on Lord Roles  being transferred to [facility] for routine progression of care       Report consisted of patients Situation, Background, Assessment and   Recommendations(SBAR). Information from the following report(s) SBAR was reviewed with the receiving nurse. Lines:   Peripheral IV 01/05/18 Left Wrist (Active)   Site Assessment Clean, dry, & intact 1/5/2018  4:50 PM   Phlebitis Assessment 0 1/5/2018  4:50 PM   Infiltration Assessment 0 1/5/2018  4:50 PM   Dressing Status Clean, dry, & intact 1/5/2018  4:50 PM   Dressing Type Tape;Transparent 1/5/2018  4:50 PM   Hub Color/Line Status Green; Infusing 1/5/2018  4:50 PM        Opportunity for questions and clarification was provided.       Patient transported with:   O2 @ 2 liters

## 2018-01-05 NOTE — ANESTHESIA PROCEDURE NOTES
Peripheral Block    Start time: 1/5/2018 1:39 PM  End time: 1/5/2018 1:48 PM  Performed by: Ezequiel Kat  Authorized by: Ezequiel Kat       Pre-procedure: Indications: at surgeon's request, post-op pain management and procedure for pain    Preanesthetic Checklist: patient identified, risks and benefits discussed, site marked, timeout performed, anesthesia consent given and patient being monitored    Timeout Time: 13:39          Block Type:   Block Type:   Interscalene  Laterality:  Right  Monitoring:  Standard ASA monitoring, continuous pulse ox, frequent vital sign checks, heart rate, oxygen and responsive to questions  Injection Technique:  Single shot  Procedures: ultrasound guided and nerve stimulator    Patient Position: supine  Prep: chlorhexidine    Location:  Interscalene  Needle Type:  Stimuplex  Needle Gauge:  21 G  Needle Localization:  Ultrasound guidance and anatomical landmarks  Motor Response: minimal motor response >0.4 mA    Medication Injected:  0.5%  bupivacaine  Adds:  Epi 1:200K  Volume (mL):  40    Assessment:  Number of attempts:  1  Injection Assessment:  Incremental injection every 5 mL, local visualized surrounding nerve on ultrasound, negative aspiration for blood, negative aspiration for CSF, no paresthesia, no intravascular symptoms and ultrasound image on chart  Patient tolerance:  Patient tolerated the procedure well with no immediate complications

## 2018-01-05 NOTE — DISCHARGE SUMMARY
[facility] Discharge Summary      Patient ID:  Amanda Jacome  [id number]  79 y.o.  1950    Admit date: 1/5/2018    Discharge date and time: 1/8/2018    Admitting Physician: Kym Ruiz MD     Discharge Physician: Kym Ruiz MD      Admission Diagnoses: Rotator cuff arthropathy, right [M12.811]    Discharge Diagnoses: Principal Problem:    Rotator cuff tear arthropathy, right (1/3/2018)    Active Problems:    Anemia (1/5/2018)      Urinary tract infection (1/5/2018)      Anemia associated with acute blood loss (1/5/2018)      Adrenal insufficiency ([address] (1/5/2018)      Acquired hypothyroidism (1/5/2018)      Constipation (1/5/2018)      Sinus congestion (1/5/2018)        Surgeon: Kym Ruiz MD      Preoperative Medical Clearance: YES                          Perioperative Antibiotics: Ancef  _X__                                                Vancomycin  ___          Post Op complications: none        Discharged to: Home    Discharge instructions:  -Resume pre hospital diet             -Resume home medications per medical continuation form     SLING RIGHT SHOULDER  CONTINUE PHYSICAL THERAPY  -Follow up in office as scheduled       Signed:  Kym Ruiz MD  1/8/2018  11:43 AM

## 2018-01-06 ENCOUNTER — HOME HEALTH ADMISSION (OUTPATIENT)
Dept: HOME HEALTH SERVICES | Facility: HOME HEALTH | Age: 68
End: 2018-01-06
Payer: MEDICARE

## 2018-01-06 LAB
ANION GAP SERPL CALC-SCNC: 11 MMOL/L (ref 7–16)
BUN SERPL-MCNC: 13 MG/DL (ref 8–23)
CALCIUM SERPL-MCNC: 8.7 MG/DL (ref 8.3–10.4)
CHLORIDE SERPL-SCNC: 106 MMOL/L (ref 98–107)
CO2 SERPL-SCNC: 24 MMOL/L (ref 21–32)
CREAT SERPL-MCNC: 0.69 MG/DL (ref 0.6–1)
ERYTHROCYTE [DISTWIDTH] IN BLOOD BY AUTOMATED COUNT: 12.9 % (ref 11.9–14.6)
GLUCOSE SERPL-MCNC: 139 MG/DL (ref 65–100)
HCT VFR BLD AUTO: 33.5 % (ref 35.8–46.3)
HGB BLD-MCNC: 11.2 G/DL (ref 11.7–15.4)
MAGNESIUM SERPL-MCNC: 1.8 MG/DL (ref 1.8–2.4)
MCH RBC QN AUTO: 30.4 PG (ref 26.1–32.9)
MCHC RBC AUTO-ENTMCNC: 33.4 G/DL (ref 31.4–35)
MCV RBC AUTO: 91 FL (ref 79.6–97.8)
PLATELET # BLD AUTO: 192 K/UL (ref 150–450)
PMV BLD AUTO: 10.5 FL (ref 10.8–14.1)
POTASSIUM SERPL-SCNC: 4.2 MMOL/L (ref 3.5–5.1)
RBC # BLD AUTO: 3.68 M/UL (ref 4.05–5.25)
SODIUM SERPL-SCNC: 141 MMOL/L (ref 136–145)
WBC # BLD AUTO: 7.8 K/UL (ref 4.3–11.1)

## 2018-01-06 PROCEDURE — 74011250636 HC RX REV CODE- 250/636: Performed by: ORTHOPAEDIC SURGERY

## 2018-01-06 PROCEDURE — 65270000029 HC RM PRIVATE

## 2018-01-06 PROCEDURE — 80048 BASIC METABOLIC PNL TOTAL CA: CPT | Performed by: ORTHOPAEDIC SURGERY

## 2018-01-06 PROCEDURE — 36415 COLL VENOUS BLD VENIPUNCTURE: CPT | Performed by: ORTHOPAEDIC SURGERY

## 2018-01-06 PROCEDURE — 74011250637 HC RX REV CODE- 250/637: Performed by: FAMILY MEDICINE

## 2018-01-06 PROCEDURE — 97162 PT EVAL MOD COMPLEX 30 MIN: CPT

## 2018-01-06 PROCEDURE — 74011250637 HC RX REV CODE- 250/637: Performed by: ORTHOPAEDIC SURGERY

## 2018-01-06 PROCEDURE — 85027 COMPLETE CBC AUTOMATED: CPT | Performed by: ORTHOPAEDIC SURGERY

## 2018-01-06 PROCEDURE — 94760 N-INVAS EAR/PLS OXIMETRY 1: CPT

## 2018-01-06 PROCEDURE — 83735 ASSAY OF MAGNESIUM: CPT | Performed by: ORTHOPAEDIC SURGERY

## 2018-01-06 PROCEDURE — 77010033678 HC OXYGEN DAILY

## 2018-01-06 PROCEDURE — 97110 THERAPEUTIC EXERCISES: CPT

## 2018-01-06 PROCEDURE — 77030021668 HC NEB PREFIL KT VYRM -A

## 2018-01-06 RX ADMIN — VANCOMYCIN HYDROCHLORIDE 1000 MG: 10 INJECTION, POWDER, LYOPHILIZED, FOR SOLUTION INTRAVENOUS at 02:28

## 2018-01-06 RX ADMIN — FERROUS SULFATE TAB 325 MG (65 MG ELEMENTAL FE) 325 MG: 325 (65 FE) TAB at 18:00

## 2018-01-06 RX ADMIN — HYDROMORPHONE HYDROCHLORIDE 4 MG: 4 TABLET ORAL at 04:58

## 2018-01-06 RX ADMIN — LEVOFLOXACIN 500 MG: 500 TABLET, FILM COATED ORAL at 08:49

## 2018-01-06 RX ADMIN — PRAMIPEXOLE DIHYDROCHLORIDE 0.5 MG: 0.25 TABLET ORAL at 21:20

## 2018-01-06 RX ADMIN — PROMETHAZINE HYDROCHLORIDE 12.5 MG: 25 TABLET ORAL at 11:37

## 2018-01-06 RX ADMIN — HYDROMORPHONE HYDROCHLORIDE 2 MG: 4 TABLET ORAL at 13:58

## 2018-01-06 RX ADMIN — VANCOMYCIN HYDROCHLORIDE 1000 MG: 10 INJECTION, POWDER, LYOPHILIZED, FOR SOLUTION INTRAVENOUS at 13:20

## 2018-01-06 RX ADMIN — HYDROMORPHONE HYDROCHLORIDE 2 MG: 4 TABLET ORAL at 18:00

## 2018-01-06 RX ADMIN — HYDROCORTISONE SODIUM SUCCINATE 100 MG: 100 INJECTION, POWDER, FOR SOLUTION INTRAMUSCULAR; INTRAVENOUS at 13:20

## 2018-01-06 RX ADMIN — LEVOTHYROXINE SODIUM 50 MCG: 50 TABLET ORAL at 04:58

## 2018-01-06 RX ADMIN — PRAMIPEXOLE DIHYDROCHLORIDE 0.5 MG: 0.25 TABLET ORAL at 08:49

## 2018-01-06 RX ADMIN — HYDROCORTISONE SODIUM SUCCINATE 100 MG: 100 INJECTION, POWDER, FOR SOLUTION INTRAMUSCULAR; INTRAVENOUS at 05:02

## 2018-01-06 RX ADMIN — HYDROMORPHONE HYDROCHLORIDE 4 MG: 4 TABLET ORAL at 21:20

## 2018-01-06 RX ADMIN — HYDROMORPHONE HYDROCHLORIDE 4 MG: 4 TABLET ORAL at 02:28

## 2018-01-06 RX ADMIN — DOCUSATE SODIUM 100 MG: 100 CAPSULE, LIQUID FILLED ORAL at 08:49

## 2018-01-06 RX ADMIN — DOCUSATE SODIUM 100 MG: 100 CAPSULE, LIQUID FILLED ORAL at 18:00

## 2018-01-06 RX ADMIN — FERROUS SULFATE TAB 325 MG (65 MG ELEMENTAL FE) 325 MG: 325 (65 FE) TAB at 08:49

## 2018-01-06 RX ADMIN — HYDROMORPHONE HYDROCHLORIDE 4 MG: 4 TABLET ORAL at 08:49

## 2018-01-06 RX ADMIN — HYDROMORPHONE HYDROCHLORIDE 1 MG: 2 INJECTION, SOLUTION INTRAMUSCULAR; INTRAVENOUS; SUBCUTANEOUS at 23:07

## 2018-01-06 NOTE — CONSULTS
HOSPITALIST INITIAL CONSULT NOTE    NAME:  Freddie Zepeda   Age:  79 y.o.  :   1950   MRN:   [id number]  PCP: Rosalea Baumgarten, MD  Consulting MD:  Treatment Team: Attending Provider: Anjel Garzon MD; Consulting Provider: Patrick Lee MD; Consulting Provider: Kendy Roblero MD    REASON FOR CONSULT: management of chronic medical problems    HISTORY OF PRESENT ILLNESS AT TIME OF CONSULT:   Freddie Zepeda is a 79y.o. year-old female with a past medical history of TBI s/p fall with SAH about 1 year ago, adrenal insufficiency since the same incident, and hypothyroidism who is post-op day # 0 s/p reverse R total shoulder arthroplasty by Dr. Rajani Zuniga. She reports that she is feeling quite well post-op, only complaining of a dry mouth. Denies any pain or nausea currently. Admits to some dark urine and worse incontinence than usual over the past few days. Also admits to hard stools, most recently this morning. Denies fevers, chills, vomiting, chest pain, shortness of breath. REVIEW OF SYSTEMS: Comprehensive ROS performed and negative except as stated in HPI. Past Medical History:   Diagnosis Date    Anemia     required transfusions in 2017    Arthritis     Chronic sinusitis     GERD (gastroesophageal reflux disease)     rare - Pepcid AC prn     Muscle weakness     Nausea & vomiting     severe after shoulder manipulation.  Panhypopituitarism ([facility] [address] 2017    followed by Dr. Denys Olszewski [facility] (subarachnoid hemorrhage) ([facility] [address] 2017    s/p fall. Left side was paralyzed - now with residual leg weakness. Walking with cane.  Secondary adrenal insufficiency (HCC)     Secondary hypothyroidism     Vitamin D deficiency         Past Surgical History:   Procedure Laterality Date    HX  SECTION      X 3    HX ROTATOR CUFF REPAIR Right     followed by manipulation several months after surgery.         Prior to Admission Medications   Prescriptions Last Dose Informant Patient Reported? Taking?   hydrocortisone (CORTEF) 5 mg tablet 2018 at Unknown time  Yes Yes   Sig: Take  by mouth two (2) times a day. Takes 10 mg in AM and 5 mg about 5:00 pm     Take / use AM day of surgery  per anesthesia protocols. levothyroxine (SYNTHROID) 50 mcg tablet 2018 at Unknown time  Yes Yes   Sig: Take 50 mcg by mouth Daily (before breakfast). Take / use AM day of surgery  per anesthesia protocols. potassium chloride (KLOR-CON M20) 20 mEq tablet 2018 at Unknown time  Yes Yes   Sig: Take 20 mEq by mouth two (2) times a day. pramipexole (MIRAPEX) 0.25 mg tablet 2018 at Unknown time  Yes Yes   Sig: Take 0.5 mg by mouth three (3) times daily. Take / use AM day of surgery  per anesthesia protocols. Facility-Administered Medications: None       Allergies   Allergen Reactions    Pcn [Penicillins] Rash    Sulfa (Sulfonamide Antibiotics) Rash       FAMILY HISTORY: Reviewed. Negative except   Family History   Problem Relation Age of Onset    Cancer Mother      breast    Cancer Father      lung    Heart Disease Father        Social History   Substance Use Topics    Smoking status: Never Smoker    Smokeless tobacco: Never Used    Alcohol use Yes      Comment: rare - socially          Objective:     Visit Vitals    /63    Pulse 69    Temp 99 °F (37.2 °C)    Resp 16    SpO2 96%      Temp (24hrs), Av.9 °F (37.2 °C), Min:98.7 °F (37.1 °C), Max:99 °F (37.2 °C)    Oxygen Therapy  O2 Sat (%): 96 % (18 180)  Pulse via Oximetry: 68 beats per minute (18 0934)  O2 Device: Nasal cannula (18)  O2 Flow Rate (L/min): 2 l/min (18 180)  Physical Exam:  General:    The patient is a very pleasant elderly female in no acute distress. Head:   Normocephalic/atraumatic. Eyes:  No palpebral pallor or scleral icterus. ENT:  External auricular and nasal exam within normal limits. Mucous membranes are moist.  Neck:  Supple, non-tender, no JVD.   Lungs: Clear to auscultation bilaterally without wheezes or crackles. No respiratory distress or accessory muscle use. Heart:   Regular rate and rhythm, without murmurs, rubs, or gallops. Abdomen:   Soft, non-tender, non-distended with normoactive bowel sounds. Genitourinary: No tenderness over the bladder or bilateral CVAs. Extremities: Without clubbing, cyanosis, or edema. R shoulder dressed and in post-op sling. Skin:     Normal color, texture, and turgor. No rashes, lesions, or jaundice. Pulses: Radial and dorsalis pedis pulses present 2+ bilaterally. Capillary refill <2s. Neurologic: CN II-XII grossly intact and symmetrical.     Moving all four extremities well with no focal deficits. Psychiatric: Pleasant demeanor, appropriate affect. Alert and oriented x 3    Data Review:   Potassium yesterday 3.3, Cr 0.74, U/A w trace blood, positive nitrites, small Leukocyte Esterase, 4+ bacteria    Imaging /Procedures /Studies:  No results found. Assessment and Recommendations: Active Hospital Problems    Diagnosis Date Noted    Hypokalemia 01/05/2018    Anemia 01/05/2018    Urinary tract infection 01/05/2018    Anemia associated with acute blood loss 01/05/2018    Rotator cuff tear arthropathy of right shoulder 01/05/2018    Adrenal insufficiency ([facility] [address] 01/05/2018    Acquired hypothyroidism 01/05/2018    Constipation 01/05/2018    Sinus congestion 01/05/2018    Rotator cuff tear arthropathy, right 01/03/2018       - Adrenal insufficiency. Stable. Per pt, endocrinology recommended 3 day course of tripled home dose (home dose: cortef 10 mg QAM, 5 mg QPM) after time of surgery. Agree with IV SoluCortef for time being. Can transition to increased oral dose at primary team's discretion. Will monitor blood pressure closely and daily potassium levels. - Chronic hypokalemia. 2/2 adrenal insufficiency. Will continue home Potassium supplement.     - UTI. Abnormal U/A. Will culture urine.  Pt already on oral Levaquin, should cover UTI unless symptoms worsen or culture shows otherwise. - DVT Prophylaxis: per primary team.    Thank you for the opportunity to participate in Mrs. Reagan's care. We'll continue to follow along. Please don't hesitate to call if you have any questions.     Signed By: Pedro Gonzalez MD     January 5, 2018

## 2018-01-06 NOTE — PROGRESS NOTES
Oxygen rounds:     01/06/18 0737   Oxygen Therapy   O2 Sat (%) 93 %   Pulse via Oximetry 63 beats per minute   O2 Device Nasal cannula   O2 Flow Rate (L/min) 2 l/min

## 2018-01-06 NOTE — PROGRESS NOTES
Hospitalist Progress Note    2018  Admit Date: 2018  8:55 AM   NAME: Roscoe Tierney   :  1950   MRN:  [id number]   Attending: Amarilis Berg MD  PCP:  Jonathon Wahl MD    SUBJECTIVE:   FOLLOW-UP CONSULT NOTE    Patient tells me she feels great this morning and does not describe any pain. She does have questions about icing her shoulder and discharge planning, but I did defer this to her primary team.    She is aware of her UTI and understands need for antibiotics. She also describes Endocrinology's recommendations on how and when to restart her Cortef. No complaints this morning. Review of Systems negative with exception of pertinent positives noted above  PHYSICAL EXAM     Visit Vitals    BP 99/61 (BP 1 Location: Left arm, BP Patient Position: At rest)    Pulse 60    Temp 97.4 °F (36.3 °C)    Resp 16    SpO2 93%      Temp (24hrs), Av.2 °F (36.8 °C), Min:97.4 °F (36.3 °C), Max:99 °F (37.2 °C)    Oxygen Therapy  O2 Sat (%): 93 % (18)  Pulse via Oximetry: 63 beats per minute (18)  O2 Device: Nasal cannula (18)  O2 Flow Rate (L/min): 2 l/min (18)    Intake/Output Summary (Last 24 hours) at 18 0917  Last data filed at 18 0657   Gross per 24 hour   Intake             1000 ml   Output             1350 ml   Net             -350 ml      General: No acute distress    Lungs:  CTA Bilaterally.    Heart:  Regular rate and rhythm,  No murmur, rub, or gallop  Abdomen: Soft, Non distended, Non tender, Positive bowel sounds      ASSESSMENT      Active Hospital Problems    Diagnosis Date Noted    Hypokalemia 2018    Anemia 2018    Urinary tract infection 2018    Anemia associated with acute blood loss 2018    Rotator cuff tear arthropathy of right shoulder 2018    Adrenal insufficiency ([address] 2018    Acquired hypothyroidism 2018    Constipation 2018    Sinus congestion 2018  Rotator cuff tear arthropathy, right 01/03/2018     Plan:   Adrenal insufficiency  - Stable  - Per pt, endocrinology recommended 3 day course of tripled home dose (home dose: cortef 10 mg QAM, 5 mg QPM) after time of surgery. - Can transition to increased oral dose at primary team's discretion, okay to do on discharge or in hospital given stability of symptoms.     Chronic hypokalemia  - 2/2 adrenal insufficiency  - K is normal today at 4.2  - Recommend continued home potassium supplement.      UTI  - Abnormal UA  - Urine culture pending  - Pt already on oral Levaquin, should cover UTI unless symptoms worsen or culture shows otherwise. DVT Prophylaxis: per primary team.    Appreciate the opportunity to be involved in this patient's care. She is very stable from a medical standpoint, with understanding of plan of care going forward post-operatively. Recommendations for continued care of adrenal insufficiency and UTI as outlined above. Will sign-off today. Please feel free to contact me if other issues arise.     Signed By: Fernando Hoyos MD     January 6, 2018

## 2018-01-06 NOTE — PROGRESS NOTES
Orthopedic Progress Note    2018  Admit Date: 2018  Admit Diagnosis: Rotator cuff arthropathy, right [M12.811]    Post Op day: 1 Day Post-Op    Subjective:     Raghavendra Taylor     Patient appears to be doing okay       Objective:     Vital Signs:    Blood pressure 99/61, pulse 60, temperature 97.4 °F (36.3 °C), resp. rate 16, SpO2 93 %.   Temp (24hrs), Av.2 °F (36.8 °C), Min:97.4 °F (36.3 °C), Max:99 °F (37.2 °C)      LAB:    [unfilled]  Lab Results   Component Value Date/Time    INR 0.9 2018 01:15 PM     Lab Results   Component Value Date/Time    HGB 11.2 2018 04:02 AM    HGB 13.7 2018 01:15 PM       Physical Exam:    Patient appears to be A/O  No apparent distress   No neurovascular issues noted   No obvious problems        Plan:      Continue PT/OT/Rehab as planned   Nursing and SS for disposition plans         Signed By: Rick Orellana MD

## 2018-01-06 NOTE — PROGRESS NOTES
Problem: Mobility Impaired (Adult and Pediatric)  Goal: *Acute Goals and Plan of Care (Insert Text)  GOALS (1-4 days):  (1.)  Patient will move from supine to sit and sit to supine  in bed with STAND BY ASSIST.    (2.)  Patient will transfer from bed to chair and chair to bed with STAND BY ASSIST using the least restrictive device. (3.)  Patient will ambulate with STAND BY ASSIST for 250 feet with the least restrictive device. (4.)  Patient will be independent with shoulder HEP to increase range of motion per MD orders. ________________________________________________________________________________________________    PHYSICAL THERAPY: Initial Assessment, Daily Note, Treatment Day: Day of Assessment, AM 1/6/2018  INPATIENT: Hospital Day: 2  [place] / Product Type: Medicare /      NAME/AGE/GENDER: Collins Bhardwaj is a 79 y.o. female   PRIMARY DIAGNOSIS: Rotator cuff arthropathy, right [M12.811] Rotator cuff tear arthropathy, right Rotator cuff tear arthropathy, right  Procedure(s) (LRB):             REVERSE RIGHT TOTAL SHOULDER ARTHROPLASTY WITH DELTA EXTEND PROSTHESIS, BICEPS TENODESIS, LATISSIMUS DORSI AND TERES MAJOR TENDON TRANSFER RIGHT SHOULDER (Right)  1 Day Post-Op  ICD-10: Treatment Diagnosis:   · Pain in Right Shoulder (M25.511)  · Stiffness of Right Shoulder, Not elsewhere classified (M25.611)  · Generalized Muscle Weakness (M62.81)  · Difficulty in walking, Not elsewhere classified (R26.2)   Precaution/Allergies:  Pcn [penicillins] and Sulfa (sulfonamide antibiotics)      ASSESSMENT:     Ms. Saida Marino presents s/p R reverse total shoulder on 1/5/18. She is up in bed upon arrival and eager to participate in therapy. She has no complaints of pain. Patient with history of TBI with L LE weakness and requires and AFO for ambulation. She reports good progress with outpatient rehab and ambulating with a straight cane prior to R shoulder surgery.  Patient able to move from supine to sit edge of bed with min assist. Stood at bedside for 5 minutes with no complaints of dizziness or problems. Able to perform exercises edge of bed prior to returning to bed. Once in bed, PT and patient's family member helped her scoot up in bed. After transfer, patient nauseated and vomited. Reported feeling better afterwards with no complaints. Patient plans to discharge home with assistance and states her  is setting up someone to come stay with her at home. She would benefit from continued skilled therapy through duration of hospital stay and home health physical therapy upon discharge home. This section established at most recent assessment   PROBLEM LIST (Impairments causing functional limitations):  1. Decreased Vance with Bed Mobility  2. Decreased Vance with Transfers  3. Decreased Vance with Ambulation   4. Decreased Vance with shoulder HEP   INTERVENTIONS PLANNED: (Benefits and precautions of physical therapy have been discussed with the patient.)  1. Bed Mobility Training  2. Transfer Training  3. Gait Training  4. Therapeutic Exercises per MD orders  5. Modalities for Pain     TREATMENT PLAN: Frequency/Duration: twice daily for duration of hospital stay  Rehabilitation Potential For Stated Goals: Good     RECOMMENDED REHABILITATION/EQUIPMENT: (at time of discharge pending progress): Continue Skilled Therapy and Home Health: Physical Therapy. HISTORY:   History of Present Injury/Illness (Reason for Referral):  S/p reverse R TSA on 1/5/18. Past Medical History/Comorbidities:   Ms. Vital Service  has a past medical history of Anemia; Arthritis; Chronic sinusitis; GERD (gastroesophageal reflux disease); Muscle weakness; Nausea & vomiting; Panhypopituitarism (HCC) (02/09/2017); SAH (subarachnoid hemorrhage) ([facility] [address] (02/09/2017); Secondary adrenal insufficiency ([facility] [address] Secondary hypothyroidism; and Vitamin D deficiency.  She also has no past medical history of Adverse effect of anesthesia; Aneurysm ([facility] [address] Arrhythmia; Asthma; CAD (coronary artery disease); Cancer ([facility] [address] Chronic kidney disease; Chronic obstructive pulmonary disease ([facility] [address] Chronic pain; Coagulation disorder ([facility] [address] Diabetes ([facility] [address] Difficult intubation; Endocarditis; Heart failure ([facility] [address] Hypertension; Ill-defined condition; Liver disease; Malignant hyperthermia due to anesthesia; Nicotine vapor product user; Non-nicotine vapor product user; Pseudocholinesterase deficiency; Psychiatric disorder; PUD (peptic ulcer disease); Rheumatic fever; Seizures ([facility] [address] Sleep apnea; Stroke [facility]); or Thromboembolus ([facility] [address] Ms. Andrew Thomas  has a past surgical history that includes hx  section and hx rotator cuff repair (Right). Social History/Living Environment:   Home Environment: Private residence  One/Two Story Residence: Two story  # of Interior Steps:  (chair lift)  Lift Chair Available: Yes  Living Alone: No  Support Systems: Spouse/Significant Other/Partner  Patient Expects to be Discharged to[de-identified] Private residence  Current DME Used/Available at Home: Mac Rubins, straight, Walker, rolling  Prior Level of Function/Work/Activity:  Patient with prior TBI and L LE weakness. L AFO for ambulation and reports being in outpatient rehabilitation prior to shoulder surgery. Was ambulating with straight cane prior to R shoulder surgery. Number of Personal Factors/Comorbidities that affect the Plan of Care: 3+: HIGH COMPLEXITY   EXAMINATION:   Most Recent Physical Functioning:   Gross Assessment:  AROM: Generally decreased, functional (except R UE, L LE weakness/ decreased ROM from prior injury)  Strength:  (except R UE; L LE weakness from prior injury)               Posture:  Posture (WDL): Exceptions to WDL  Posture Assessment: Forward head, Kyphosis, Trunk flexion  Balance:  Sitting: Intact  Standing: Pull to stand; With support Bed Mobility:  Supine to Sit: Minimum assistance;Assist x1  Sit to Supine: Minimum assistance;Assist x1  Scooting: Moderate assistance;Assist x2 (scooting up in bed)  Wheelchair Mobility:     Transfers:  Sit to Stand: Contact guard assistance  Stand to Sit: Contact guard assistance  Gait:         Strength:          L LE weakness with drop foot and AFO required for ambulation   Body Structures Involved:  1. Bones  2. Joints  3. Muscles  4. Ligaments Body Functions Affected:  1. Sensory/Pain  2. Neuromusculoskeletal  3. Movement Related Activities and Participation Affected:  1. General Tasks and Demands  2. Mobility  3. Self Care  4. Domestic Life  5. Interpersonal Interactions and Relationships  6. Community, Social and [place]   Number of elements that affect the Plan of Care: 3: MODERATE COMPLEXITY   CLINICAL PRESENTATION:   Presentation: Evolving clinical presentation with changing clinical characteristics: MODERATE COMPLEXITY   CLINICAL DECISION MAKIN [facility] Short Form  How much difficulty does the patient currently have. .. Unable A Lot A Little None   1. Turning over in bed (including adjusting bedclothes, sheets and blankets)? [] 1   [x] 2   [] 3   [] 4   2. Sitting down on and standing up from a chair with arms ( e.g., wheelchair, bedside commode, etc.)   [] 1   [] 2   [x] 3   [] 4   3. Moving from lying on back to sitting on the side of the bed? [] 1   [] 2   [x] 3   [] 4   How much help from another person does the patient currently need. .. Total A Lot A Little None   4. Moving to and from a bed to a chair (including a wheelchair)? [] 1   [] 2   [x] 3   [] 4   5. Need to walk in hospital room? [] 1   [x] 2   [] 3   [] 4   6. Climbing 3-5 steps with a railing? [] 1   [x] 2   [] 3   [] 4   © , Trustees of [address] [place] [address] [address] [address], under license to [employer].  All rights reserved      Score:  Initial: 15 Most Recent: X (Date: -- )    Interpretation of Tool:  Represents activities that are increasingly more difficult (i.e. Bed mobility, Transfers, Gait). Score 24 23 22-20 19-15 14-10 9-7 6     Modifier CH CI CJ CK CL CM CN      ? Mobility - Walking and Moving Around:     - CURRENT STATUS: CK - 40%-59% impaired, limited or restricted    - GOAL STATUS: CJ - 20%-39% impaired, limited or restricted    - D/C STATUS:  ---------------To be determined---------------  [place] / [place] / Product Type: Medicare /      Medical Necessity:     · Patient is expected to demonstrate progress in strength, range of motion and functional technique to increase independence with ADLs and transfers and improve safety during ambulation and transfers. · Skilled intervention continues to be required due to impaired mobility s/p R reverese TSA. Reason for Services/Other Comments:  · Patient continues to require skilled intervention due to decreased mobility and independence s/p R reverse total shoudler surgery. Use of outcome tool(s) and clinical judgement create a POC that gives a: Questionable prediction of patient's progress: MODERATE COMPLEXITY            TREATMENT:   (In addition to Assessment/Re-Assessment sessions the following treatments were rendered)   Pre-treatment Symptoms/Complaints:  Patient with no complaints of pain. Pain: Initial:   Pain Intensity 1: 0  Pain Location 1: Shoulder  Pain Orientation 1: Right  Post Session:  0/10     Therapeutic Exercise: (15 Minutes):  Exercises per grid below to improve mobility, strength and coordination. Required minimal verbal cues to promote proper body alignment, promote proper body posture and promote proper body mechanics. Progressed resistance, range, repetitions and complexity of movement as indicated.      Date:  1/6/18 Date:   Date:     ACTIVITY/EXERCISE AM PM AM PM AM PM   Gripping 10A        Wrist Flexion/Extension 10A        Wrist Ulnar/Radial Deviation         Pronation/Supination 10A        Elbow Flexion/Extension 10A Shoulder Flexion/Extension 10P        Shoulder AB/ADduction         Shoulder IR/ER         Pulleys         Pendulums         Shrugs         Isometric:                 Flexion         Extension         ABduction         ADduction         Biceps/Triceps                  B = bilateral; AA = active assistive; A = active; P = passive  Education:  [x]  Home Exercises  [x]  Sling Application   []  Movement Precautions   []  Pulleys   [x]  Use of Ice   []  Other:   Treatment/Session Assessment:    · Response to Treatment:  Patient vomited after returning to bed from standing and performing exercises. Reported feeling better afterwards with no more nausea reported. · Interdisciplinary Collaboration:   o Physical Therapist  o Registered Nurse  · After treatment position/precautions:   o Supine in bed  o Bed/Chair-wheels locked  o Bed in low position  o Caregiver at bedside  o Call light within reach  o Side rails x 3   · Compliance with Program/Exercises: Will assess as treatment progresses. · Recommendations/Intent for next treatment session: \"Next visit will focus on advancements to more challenging activities and reduction in assistance provided\".   Total Treatment Duration:  PT Patient Time In/Time Out  Time In: 0955  Time Out: Π[address] 90, PT

## 2018-01-06 NOTE — PROGRESS NOTES
[facility] [place] - INPATIENT  Face to Face Encounter    Patients Name: Namita Dupree    YOB: 1950    Ordering Physician: Vladislav Morrison    Primary Diagnosis: TSA    Date of Face to Face:   1/5/2018                                  Face to Face Encounter findings are related to primary reason for home care:   yes. 1. I certify that the patient needs intermittent care as follows: physical therapy: strengthening    2. I certify that this patient is homebound, that is: 1) patient requires the use of a walker device, special transportation, or assistance of another to leave the home; or 2) patient's condition makes leaving the home medically contraindicated; and 3) patient has a normal inability to leave the home and leaving the home requires considerable and taxing effort. Patient may leave the home for infrequent and short duration for medical reasons, and occasional absences for non-medical reasons. Homebound status is due to the following functional limitations: Patient with strength deficits limiting the performance of all ADL's without caregiver assistance or the use of an assistive device. 3. I certify that this patient is under my care and that I, or a nurse practitioner or  [id number], or clinical nurse specialist, or certified nurse midwife, working with me, had a Face-to-Face Encounter that meets the physician Face-to-Face Encounter requirements. The following are the clinical findings from the [address] [place] [address] encounter that support the need for skilled services and is a summary of the encounter: See hospital chart. See attached progess note      James Jaimes LMSW  5/31/2017      THE FOLLOWING TO BE COMPLETED BY THE COMMUNITY PHYSICIAN:    I concur with the findings described above from the [facility] encounter that this patient is homebound and in need of a skilled service.     Certifying Physician: _____________________________________      Printed Certifying Physician Name: _____________________________________    Date: _________________

## 2018-01-06 NOTE — PROGRESS NOTES
Problem: Mobility Impaired (Adult and Pediatric)  Goal: *Acute Goals and Plan of Care (Insert Text)  GOALS (1-4 days):  (1.)  Patient will move from supine to sit and sit to supine  in bed with STAND BY ASSIST.    (2.)  Patient will transfer from bed to chair and chair to bed with STAND BY ASSIST using the least restrictive device. (3.)  Patient will ambulate with STAND BY ASSIST for 250 feet with the least restrictive device. (4.)  Patient will be independent with shoulder HEP to increase range of motion per MD orders. ________________________________________________________________________________________________    PHYSICAL THERAPY: Daily Note, PM 1/6/2018  INPATIENT: Hospital Day: 2  [place] / Product Type: Medicare /      NAME/AGE/GENDER: Tony Tim is a 79 y.o. female   PRIMARY DIAGNOSIS: Rotator cuff arthropathy, right [M12.811] Rotator cuff tear arthropathy, right Rotator cuff tear arthropathy, right  Procedure(s) (LRB):             REVERSE RIGHT TOTAL SHOULDER ARTHROPLASTY WITH DELTA EXTEND PROSTHESIS, BICEPS TENODESIS, LATISSIMUS DORSI AND TERES MAJOR TENDON TRANSFER RIGHT SHOULDER (Right)  1 Day Post-Op  ICD-10: Treatment Diagnosis:   · Pain in Right Shoulder (M25.511)  · Stiffness of Right Shoulder, Not elsewhere classified (M25.611)  · Generalized Muscle Weakness (M62.81)  · Difficulty in walking, Not elsewhere classified (R26.2)   Precaution/Allergies:  Pcn [penicillins] and Sulfa (sulfonamide antibiotics)      ASSESSMENT:     Ms. Cally Ceballos presents s/p R reverse total shoulder on 1/5/18. She is up in bed upon arrival and eager to participate in therapy. She has no complaints of pain. Patient with history of TBI with L LE weakness and requires and AFO for ambulation. She reports good progress with outpatient rehab and ambulating with a straight cane prior to R shoulder surgery.  Patient able to move from supine to sit edge of bed with min assist. Stood at bedside for 5 minutes with no complaints of dizziness or problems. Able to perform exercises edge of bed prior to returning to bed. Once in bed, PT and patient's family member helped her scoot up in bed. After transfer, patient nauseated and vomited. Reported feeling better afterwards with no complaints. Patient plans to discharge home with assistance and states her  is setting up someone to come stay with her at home. She would benefit from continued skilled therapy through duration of hospital stay and home health physical therapy upon discharge home. Patient reports little pain but \"pressure\". Good participation with therapy. Anxious with standing and taking side steps but did not have brace on. Will work on mobility more prior to d/c home. This section established at most recent assessment   PROBLEM LIST (Impairments causing functional limitations):  1. Decreased [place] with Bed Mobility  2. Decreased [place] with Transfers  3. Decreased [place] with Ambulation   4. Decreased [place] with shoulder HEP   INTERVENTIONS PLANNED: (Benefits and precautions of physical therapy have been discussed with the patient.)  1. Bed Mobility Training  2. Transfer Training  3. Gait Training  4. Therapeutic Exercises per MD orders  5. Modalities for Pain     TREATMENT PLAN: Frequency/Duration: twice daily for duration of hospital stay  Rehabilitation Potential For Stated Goals: Good     RECOMMENDED REHABILITATION/EQUIPMENT: (at time of discharge pending progress): Continue Skilled Therapy and Home Health: Physical Therapy. HISTORY:   History of Present Injury/Illness (Reason for Referral):  S/p reverse R TSA on 1/5/18. Past Medical History/Comorbidities:   Ms. Champion Man  has a past medical history of Anemia; Arthritis; Chronic sinusitis; GERD (gastroesophageal reflux disease);  Muscle weakness; Nausea & vomiting; Panhypopituitarism (HCC) (02/09/2017); SAH (subarachnoid hemorrhage) ([facility] (2017); Secondary adrenal insufficiency ([facility] Secondary hypothyroidism; and Vitamin D deficiency. She also has no past medical history of Adverse effect of anesthesia; Aneurysm ([facility] Arrhythmia; Asthma; CAD (coronary artery disease); Cancer ([facility] Chronic kidney disease; Chronic obstructive pulmonary disease ([facility] Chronic pain; Coagulation disorder ([facility] Diabetes ([facility] Difficult intubation; Endocarditis; Heart failure ([facility] Hypertension; Ill-defined condition; Liver disease; Malignant hyperthermia due to anesthesia; Nicotine vapor product user; Non-nicotine vapor product user; Pseudocholinesterase deficiency; Psychiatric disorder; PUD (peptic ulcer disease); Rheumatic fever; Seizures ([facility] Sleep apnea; Stroke [facility]); or Th[facility]boembolus ([facility] Ms. Salud Kang  has a past surgical history that includes hx  section and hx rotator cuff repair (Right). Social History/Living Environment:   Home Environment: Private residence  One/Two Story Residence: Two story  # of Interior Steps:  (chair lift)  Lift Chair Available: Yes  Living Alone: No  Support Systems: Spouse/Significant Other/Partner  Patient Expects to be Discharged to[de-identified] Private residence  Current DME Used/Available at Home: Madeline Halo, straight, Walker, rolling  Prior Level of Function/Work/Activity:  Patient with prior TBI and L LE weakness. L AFO for ambulation and reports being in outpatient rehabilitation prior to shoulder surgery. Was ambulating with straight cane prior to R shoulder surgery. Number of Personal Factors/Comorbidities that affect the Plan of Care: 3+: HIGH COMPLEXITY   EXAMINATION:   Most Recent Physical Functioning:   Gross Assessment:      RUE PROM  R Shoulder Flexion: 25        RUE Strength  R Elbow Flexion: 2  R : 3+  Posture:     Balance:  Sitting: Intact  Standing: Pull to stand; With support Bed Mobility:  Supine to Sit: Minimum assistance  Sit to Supine: Minimum assistance  Wheelchair Mobility:     Transfers:  Sit to Stand: Contact guard assistance  Stand to Sit: Contact guard assistance  Gait:     Speed/Magui: Shuffled; Slow  Step Length: Left shortened;Right shortened  Gait Abnormalities: Decreased step clearance  Distance (ft): 5 Feet (ft) (side steps to head of bed)  Ambulation - Level of Assistance: Minimal assistance  Interventions: Manual cues; Safety awareness training;Verbal cues   Strength:          L LE weakness with drop foot and AFO required for ambulation   Body Structures Involved:  1. Bones  2. Joints  3. Muscles  4. Ligaments Body Functions Affected:  1. Sensory/Pain  2. Neuromusculoskeletal  3. Movement Related Activities and Participation Affected:  1. General Tasks and Demands  2. Mobility  3. Self Care  4. Domestic Life  5. Interpersonal Interactions and Relationships  6. Community, Social and [place]   Number of elements that affect the Plan of Care: 3: MODERATE COMPLEXITY   CLINICAL PRESENTATION:   Presentation: Evolving clinical presentation with changing clinical characteristics: MODERATE COMPLEXITY   CLINICAL DECISION MAKIN [facility] Mobility Inpatient Short Form  How much difficulty does the patient currently have. .. Unable A Lot A Little None   1. Turning over in bed (including adjusting bedclothes, sheets and blankets)? [] 1   [x] 2   [] 3   [] 4   2. Sitting down on and standing up from a chair with arms ( e.g., wheelchair, bedside commode, etc.)   [] 1   [] 2   [x] 3   [] 4   3. Moving from lying on back to sitting on the side of the bed? [] 1   [] 2   [x] 3   [] 4   How much help from another person does the patient currently need. .. Total A Lot A Little None   4. Moving to and from a bed to a chair (including a wheelchair)? [] 1   [] 2   [x] 3   [] 4   5. Need to walk in hospital room? [] 1   [x] 2   [] 3   [] 4   6. Climbing 3-5 steps with a railing?    [] 1   [x] 2   [] 3   [] 4   © , Trustees of [address] [place] [address] [address] [address], under license to [employer]. All rights reserved      Score:  Initial: 15 Most Recent: X (Date: -- )    Interpretation of Tool:  Represents activities that are increasingly more difficult (i.e. Bed mobility, Transfers, Gait). Score 24 23 22-20 19-15 14-10 9-7 6     Modifier CH CI CJ CK CL CM CN      ? Mobility - Walking and Moving Around:     - CURRENT STATUS: CK - 40%-59% impaired, limited or restricted    - GOAL STATUS: CJ - 20%-39% impaired, limited or restricted    - D/C STATUS:  ---------------To be determined---------------  [place] / Product Type: Medicare /      Medical Necessity:     · Patient is expected to demonstrate progress in strength, range of motion and functional technique to increase independence with ADLs and transfers and improve safety during ambulation and transfers. · Skilled intervention continues to be required due to impaired mobility s/p R reverese TSA. Reason for Services/Other Comments:  · Patient continues to require skilled intervention due to decreased mobility and independence s/p R reverse total shoudler surgery. Use of outcome tool(s) and clinical judgement create a POC that gives a: Questionable prediction of patient's progress: MODERATE COMPLEXITY            TREATMENT:   (In addition to Assessment/Re-Assessment sessions the following treatments were rendered)   Pre-treatment Symptoms/Complaints:  Patient agreeable to therapy. Pain: Initial:   Pain Intensity 1: 4  Pain Location 1: Shoulder  Pain Orientation 1: Right  Pain Intervention(s) 1: Cold pack, Exercise  Post Session:  4/10     Therapeutic Exercise: (25 Minutes):  Exercises per grid below to improve mobility, strength and coordination. Required minimal verbal cues to promote proper body alignment, promote proper body posture and promote proper body mechanics. Progressed resistance, range, repetitions and complexity of movement as indicated.      Date:  1/6/18 Date: Date:     ACTIVITY/EXERCISE AM PM AM PM AM PM   Gripping 10A 15 A       Wrist Flexion/Extension 10A 15 A       Wrist Ulnar/Radial Deviation         Pronation/Supination 10A 15 A       Elbow Flexion/Extension 10A 15 AA       Shoulder Flexion/Extension 10P 15 P       Shoulder AB/ADduction         Shoulder IR/ER         Pulleys         Pendulums  30       Shrugs  15 A       Isometric:                 Flexion         Extension         ABduction         ADduction         Biceps/Triceps                  B = bilateral; AA = active assistive; A = active; P = passive  Education:  [x]  Home Exercises  [x]  Sling Application   []  Movement Precautions   []  Pulleys   [x]  Use of Ice   []  Other:   Treatment/Session Assessment:    · Response to Treatment:  Patient participated well and noted feeling better than this am.  Need to work on mobility prior to d/c home. · Interdisciplinary Collaboration:   o Physical Therapist  o Registered Nurse  · After treatment position/precautions:   o Supine in bed  o Bed/Chair-wheels locked  o Bed in low position  o Call light within reach  o Family at bedside   · Compliance with Program/Exercises: compliant all the time. · Recommendations/Intent for next treatment session: \"Next visit will focus on advancements to more challenging activities and reduction in assistance provided\".   Total Treatment Duration:  PT Patient Time In/Time Out  Time In: 1545  Time Out: [address] PT

## 2018-01-07 PROBLEM — E87.6 HYPOKALEMIA: Status: RESOLVED | Noted: 2018-01-05 | Resolved: 2018-01-07

## 2018-01-07 LAB
ANION GAP SERPL CALC-SCNC: 2 MMOL/L (ref 7–16)
BUN SERPL-MCNC: 16 MG/DL (ref 8–23)
CALCIUM SERPL-MCNC: 9.2 MG/DL (ref 8.3–10.4)
CHLORIDE SERPL-SCNC: 105 MMOL/L (ref 98–107)
CO2 SERPL-SCNC: 28 MMOL/L (ref 21–32)
CREAT SERPL-MCNC: 0.83 MG/DL (ref 0.6–1)
ERYTHROCYTE [DISTWIDTH] IN BLOOD BY AUTOMATED COUNT: 13.2 % (ref 11.9–14.6)
GLUCOSE SERPL-MCNC: 115 MG/DL (ref 65–100)
HCT VFR BLD AUTO: 34.3 % (ref 35.8–46.3)
HGB BLD-MCNC: 10.7 G/DL (ref 11.7–15.4)
MAGNESIUM SERPL-MCNC: 2 MG/DL (ref 1.8–2.4)
MCH RBC QN AUTO: 29.8 PG (ref 26.1–32.9)
MCHC RBC AUTO-ENTMCNC: 31.2 G/DL (ref 31.4–35)
MCV RBC AUTO: 95.5 FL (ref 79.6–97.8)
PLATELET # BLD AUTO: 204 K/UL (ref 150–450)
PMV BLD AUTO: 10.9 FL (ref 10.8–14.1)
POTASSIUM SERPL-SCNC: 3.7 MMOL/L (ref 3.5–5.1)
RBC # BLD AUTO: 3.59 M/UL (ref 4.05–5.25)
SODIUM SERPL-SCNC: 135 MMOL/L (ref 136–145)
WBC # BLD AUTO: 8.9 K/UL (ref 4.3–11.1)

## 2018-01-07 PROCEDURE — 74011250637 HC RX REV CODE- 250/637: Performed by: ORTHOPAEDIC SURGERY

## 2018-01-07 PROCEDURE — 80048 BASIC METABOLIC PNL TOTAL CA: CPT | Performed by: ORTHOPAEDIC SURGERY

## 2018-01-07 PROCEDURE — 65270000029 HC RM PRIVATE

## 2018-01-07 PROCEDURE — 74011250637 HC RX REV CODE- 250/637: Performed by: FAMILY MEDICINE

## 2018-01-07 PROCEDURE — 36415 COLL VENOUS BLD VENIPUNCTURE: CPT | Performed by: ORTHOPAEDIC SURGERY

## 2018-01-07 PROCEDURE — 77010033678 HC OXYGEN DAILY

## 2018-01-07 PROCEDURE — 74011250636 HC RX REV CODE- 250/636: Performed by: ORTHOPAEDIC SURGERY

## 2018-01-07 PROCEDURE — 83735 ASSAY OF MAGNESIUM: CPT | Performed by: ORTHOPAEDIC SURGERY

## 2018-01-07 PROCEDURE — 97530 THERAPEUTIC ACTIVITIES: CPT

## 2018-01-07 PROCEDURE — 97110 THERAPEUTIC EXERCISES: CPT

## 2018-01-07 PROCEDURE — 85027 COMPLETE CBC AUTOMATED: CPT | Performed by: ORTHOPAEDIC SURGERY

## 2018-01-07 PROCEDURE — 94760 N-INVAS EAR/PLS OXIMETRY 1: CPT

## 2018-01-07 RX ORDER — HYDROCORTISONE 5 MG/1
15 TABLET ORAL
Status: DISCONTINUED | OUTPATIENT
Start: 2018-01-07 | End: 2018-01-08 | Stop reason: HOSPADM

## 2018-01-07 RX ADMIN — DOCUSATE SODIUM 100 MG: 100 CAPSULE, LIQUID FILLED ORAL at 07:52

## 2018-01-07 RX ADMIN — HYDROMORPHONE HYDROCHLORIDE 4 MG: 4 TABLET ORAL at 01:33

## 2018-01-07 RX ADMIN — Medication 10 ML: at 22:00

## 2018-01-07 RX ADMIN — HYDROMORPHONE HYDROCHLORIDE 2 MG: 4 TABLET ORAL at 12:49

## 2018-01-07 RX ADMIN — HYDROMORPHONE HYDROCHLORIDE 2 MG: 4 TABLET ORAL at 17:34

## 2018-01-07 RX ADMIN — PRAMIPEXOLE DIHYDROCHLORIDE 0.5 MG: 0.25 TABLET ORAL at 07:53

## 2018-01-07 RX ADMIN — HYDROMORPHONE HYDROCHLORIDE 1 MG: 2 INJECTION, SOLUTION INTRAMUSCULAR; INTRAVENOUS; SUBCUTANEOUS at 07:48

## 2018-01-07 RX ADMIN — HYDROCORTISONE 15 MG: 5 TABLET ORAL at 21:52

## 2018-01-07 RX ADMIN — DIPHENHYDRAMINE HYDROCHLORIDE 25 MG: 25 CAPSULE ORAL at 17:33

## 2018-01-07 RX ADMIN — POTASSIUM CHLORIDE 40 MEQ: 20 TABLET, EXTENDED RELEASE ORAL at 07:53

## 2018-01-07 RX ADMIN — DIPHENHYDRAMINE HYDROCHLORIDE 25 MG: 25 CAPSULE ORAL at 21:51

## 2018-01-07 RX ADMIN — HYDROMORPHONE HYDROCHLORIDE 4 MG: 4 TABLET ORAL at 05:31

## 2018-01-07 RX ADMIN — LEVOTHYROXINE SODIUM 50 MCG: 50 TABLET ORAL at 05:30

## 2018-01-07 RX ADMIN — HYDROMORPHONE HYDROCHLORIDE 4 MG: 4 TABLET ORAL at 21:53

## 2018-01-07 RX ADMIN — DIPHENHYDRAMINE HYDROCHLORIDE 25 MG: 25 CAPSULE ORAL at 10:25

## 2018-01-07 RX ADMIN — LEVOFLOXACIN 500 MG: 500 TABLET, FILM COATED ORAL at 07:52

## 2018-01-07 RX ADMIN — HYDROCORTISONE 30 MG: 20 TABLET ORAL at 09:00

## 2018-01-07 RX ADMIN — FERROUS SULFATE TAB 325 MG (65 MG ELEMENTAL FE) 325 MG: 325 (65 FE) TAB at 07:52

## 2018-01-07 RX ADMIN — FERROUS SULFATE TAB 325 MG (65 MG ELEMENTAL FE) 325 MG: 325 (65 FE) TAB at 17:33

## 2018-01-07 RX ADMIN — DOCUSATE SODIUM 100 MG: 100 CAPSULE, LIQUID FILLED ORAL at 17:34

## 2018-01-07 RX ADMIN — PRAMIPEXOLE DIHYDROCHLORIDE 0.5 MG: 0.25 TABLET ORAL at 21:51

## 2018-01-07 NOTE — PROGRESS NOTES
Problem: Mobility Impaired (Adult and Pediatric)  Goal: *Acute Goals and Plan of Care (Insert Text)  GOALS (1-4 days):  (1.)  Patient will move from supine to sit and sit to supine  in bed with STAND BY ASSIST.    (2.)  Patient will transfer from bed to chair and chair to bed with STAND BY ASSIST using the least restrictive device. (3.)  Patient will ambulate with STAND BY ASSIST for 250 feet with the least restrictive device. (4.)  Patient will be independent with shoulder HEP to increase range of motion per MD orders. ________________________________________________________________________________________________    PHYSICAL THERAPY: Daily Note, AM 1/7/2018  INPATIENT: Hospital Day: 3  [place] / Product Type: Medicare /      NAME/AGE/GENDER: Nixon Proctor is a 79 y.o. female   PRIMARY DIAGNOSIS: Rotator cuff arthropathy, right [M12.811] Rotator cuff tear arthropathy, right Rotator cuff tear arthropathy, right  Procedure(s) (LRB):             REVERSE RIGHT TOTAL SHOULDER ARTHROPLASTY WITH DELTA EXTEND PROSTHESIS, BICEPS TENODESIS, LATISSIMUS DORSI AND TERES MAJOR TENDON TRANSFER RIGHT SHOULDER (Right)  2 Days Post-Op  ICD-10: Treatment Diagnosis:   · Pain in Right Shoulder (M25.511)  · Stiffness of Right Shoulder, Not elsewhere classified (M25.611)  · Generalized Muscle Weakness (M62.81)  · Difficulty in walking, Not elsewhere classified (R26.2)   Precaution/Allergies:  Pcn [penicillins] and Sulfa (sulfonamide antibiotics)      ASSESSMENT:     Ms. Andrew Thomas presents s/p R reverse total shoulder on 1/5/18. She is up in bed upon arrival and eager to participate in therapy. She has no complaints of pain. Patient with history of TBI with L LE weakness and requires and AFO for ambulation. She reports good progress with outpatient rehab and ambulating with a straight cane prior to R shoulder surgery.  Patient able to move from supine to sit edge of bed with min assist. Stood at bedside for 5 minutes with no complaints of dizziness or problems. Able to perform exercises edge of bed prior to returning to bed. Once in bed, PT and patient's family member helped her scoot up in bed. After transfer, patient nauseated and vomited. Reported feeling better afterwards with no complaints. Patient plans to discharge home with assistance and states her  is setting up someone to come stay with her at home. She would benefit from continued skilled therapy through duration of hospital stay and home health physical therapy upon discharge home. Patient reports moderately increased pain this morning. She is still anxious about mobility to and from bathroom. Will work on mobility more with AFO this afternoon. This section established at most recent assessment   PROBLEM LIST (Impairments causing functional limitations):  1. Decreased [place] with Bed Mobility  2. Decreased [place] with Transfers  3. Decreased [place] with Ambulation   4. Decreased [place] with shoulder HEP   INTERVENTIONS PLANNED: (Benefits and precautions of physical therapy have been discussed with the patient.)  1. Bed Mobility Training  2. Transfer Training  3. Gait Training  4. Therapeutic Exercises per MD orders  5. Modalities for Pain     TREATMENT PLAN: Frequency/Duration: twice daily for duration of hospital stay  Rehabilitation Potential For Stated Goals: Good     RECOMMENDED REHABILITATION/EQUIPMENT: (at time of discharge pending progress): Continue Skilled Therapy and Home Health: Physical Therapy. HISTORY:   History of Present Injury/Illness (Reason for Referral):  S/p reverse R TSA on 1/5/18. Past Medical History/Comorbidities:   Ms. Mahnaz Lechuga  has a past medical history of Anemia; Arthritis; Chronic sinusitis; GERD (gastroesophageal reflux disease); Muscle weakness; Nausea & vomiting; Panhypopituitarism (HCC) (02/09/2017); SA[facility] (02/09/2017);  Secondary adrenal insufficiency ([facility] Secondary hypothyroidism; and Vitamin D deficiency. She also has no past medical history of Adverse effect of anesthesia; Aneurysm ([facility] Arrhythmia; Asthma; CAD (coronary artery disease); Cancer ([facility] Chronic kidney disease; Chronic obstructive pulmonary disease ([facility] Chronic pain; Coagulation disorder ([facility] Diabetes ([facility] Difficult intubation; Endocarditis; [facility] failure ([facility] Hypertension; Ill-defined condition; Liver disease; Malignant hyperthermia due to anesthesia; Nicotine vapor product user; Non-nicotine vapor product user; Pseudocholinesterase deficiency; Psychiatric disorder; PUD (peptic ulcer disease); Rheumatic fever; Seizures ([facility] Sleep apnea; Stroke [facility]); or Thromboembolus ([facility] Ms. Selene Felton  has a past surgical history that includes hx  section and hx rotator cuff repair (Right). Social History/Living Environment:   Home Environment: Private residence  One/Two Story Residence: Two story  # of Interior Steps:  (chair lift)  Lift Chair Available: Yes  Living Alone: No  Support Systems: Spouse/Significant Other/Partner  Patient Expects to be Discharged to[de-identified] Private residence  Current DME Used/Available at Home: anali Tavera Walker, rolling  Prior Level of Function/Work/Activity:  Patient with prior TBI and L LE weakness. L AFO for ambulation and reports being in outpatient rehabilitation prior to shoulder surgery. Was ambulating with straight cane prior to R shoulder surgery. Number of Personal Factors/Comorbidities that affect the Plan of Care: 3+: HIGH COMPLEXITY   EXAMINATION:   Most Recent Physical Functioning:   Gross Assessment:  AROM: Generally decreased, functional  Strength: Generally decreased, functional (L foot/ankle nonfunctional without AFO)            RUE Strength  R Elbow Flexion: 2  R : 3  Posture:     Balance:  Sitting: Intact  Standing: Pull to stand; With support Bed Mobility:  Supine to Sit: Minimum assistance  Sit to Supine: Minimum assistance  Wheelchair Mobility:     Transfers:  Sit to Stand: Minimum assistance  Stand to Sit: Contact guard assistance  Gait:     Speed/Magui: Shuffled; Slow  Step Length: Left shortened;Right shortened  Gait Abnormalities: Decreased step clearance;Shuffling gait  Distance (ft): 5 Feet (ft) (sidestep to head of bed)  Ambulation - Level of Assistance: Minimal assistance  Interventions: Manual cues; Safety awareness training;Verbal cues   Strength:          L LE weakness with drop foot and AFO required for ambulation   Body Structures Involved:  1. Bones  2. Joints  3. Muscles  4. Ligaments Body Functions Affected:  1. Sensory/Pain  2. Neuromusculoskeletal  3. Movement Related Activities and Participation Affected:  1. General Tasks and Demands  2. Mobility  3. Self Care  4. Domestic Life  5. Interpersonal Interactions and Relationships  6. Community, Social and [place]   Number of elements that affect the Plan of Care: 3: MODERATE COMPLEXITY   CLINICAL PRESENTATION:   Presentation: Evolving clinical presentation with changing clinical characteristics: MODERATE COMPLEXITY   CLINICAL DECISION MAKIN [facility] Inpatient Short Form  How much difficulty does the patient currently have. .. Unable A Lot A Little None   1. Turning over in bed (including adjusting bedclothes, sheets and blankets)? [] 1   [x] 2   [] 3   [] 4   2. Sitting down on and standing up from a chair with arms ( e.g., wheelchair, bedside commode, etc.)   [] 1   [] 2   [x] 3   [] 4   3. Moving from lying on back to sitting on the side of the bed? [] 1   [] 2   [x] 3   [] 4   How much help from another person does the patient currently need. .. Total A Lot A Little None   4. Moving to and from a bed to a chair (including a wheelchair)? [] 1   [] 2   [x] 3   [] 4   5. Need to walk in hospital room? [] 1   [x] 2   [] 3   [] 4   6. Climbing 3-5 steps with a railing?    [] 1   [x] 2   [] 3   [] 4   © 2007, Trustees of [address] [place] [address] [address] [address], under license to [employer]. All rights reserved      Score:  Initial: 15 Most Recent: X (Date: -- )    Interpretation of Tool:  Represents activities that are increasingly more difficult (i.e. Bed mobility, Transfers, Gait). Score 24 23 22-20 19-15 14-10 9-7 6     Modifier CH CI CJ CK CL CM CN      ? Mobility - Walking and Moving Around:     - CURRENT STATUS: CK - 40%-59% impaired, limited or restricted    - GOAL STATUS: CJ - 20%-39% impaired, limited or restricted    - D/C STATUS:  ---------------To be determined---------------  [place] / Product Type: Medicare /      Medical Necessity:     · Patient is expected to demonstrate progress in strength, range of motion and functional technique to increase independence with ADLs and transfers and improve safety during ambulation and transfers. · Skilled intervention continues to be required due to impaired mobility s/p R reverese TSA. Reason for Services/Other Comments:  · Patient continues to require skilled intervention due to decreased mobility and independence s/p R reverse total shoudler surgery. Use of outcome tool(s) and clinical judgement create a POC that gives a: Questionable prediction of patient's progress: MODERATE COMPLEXITY            TREATMENT:   (In addition to Assessment/Re-Assessment sessions the following treatments were rendered)   Pre-treatment Symptoms/Complaints:  Patient agreeable to therapy. She had increased pain last night and is still hurting some this morning. Pain: Initial:   Pain Intensity 1: 6  Pain Location 1: Shoulder  Pain Intervention(s) 1: Exercise, Ice, Repositioned  Post Session:  6/10   Therapeutic Activity: (    15): Therapeutic activities including Bed transfers and Ambulation on level ground to improve mobility, strength, balance and coordination. Required minimal Manual cues; Safety awareness training;Verbal cues to promote static and dynamic balance in standing. Therapeutic Exercise: (25 Minutes):  Exercises per grid below to improve mobility, strength and coordination. Required minimal verbal cues to promote proper body alignment, promote proper body posture and promote proper body mechanics. Progressed resistance, range, repetitions and complexity of movement as indicated. Date:  1/6/18 Date:  1/7/18 Date:     ACTIVITY/EXERCISE AM PM AM PM AM PM   Gripping 10A 15 A 15 A      Wrist Flexion/Extension 10A 15 A 15 A      Wrist Ulnar/Radial Deviation         Pronation/Supination 10A 15 A 15 A      Elbow Flexion/Extension 10A 15 AA 15 AA      Shoulder Flexion/Extension 10P 15 P       Shoulder AB/ADduction         Shoulder IR/ER         Pulleys         Pendulums  30 30      Shrugs  15 A 15 A      Isometric:                 Flexion         Extension         ABduction         ADduction         Biceps/Triceps                  B = bilateral; AA = active assistive; A = active; P = passive  Education:  [x]  Home Exercises  [x]  Sling Application   []  Movement Precautions   []  Pulleys   [x]  Use of Ice   []  Other:   Treatment/Session Assessment:    · Response to Treatment:  Patient with good tolerance to today's treatment. Will need to work on mobility more this afternoon with AFO on.   · Interdisciplinary Collaboration:   o Physical Therapist  o Registered Nurse  · After treatment position/precautions:   o Supine in bed  o Bed/Chair-wheels locked  o Bed in low position  o Call light within reach  o RN notified   · Compliance with Program/Exercises: compliant all the time. · Recommendations/Intent for next treatment session: \"Next visit will focus on advancements to more challenging activities and reduction in assistance provided\".   Total Treatment Duration:  PT Patient Time In/Time Out  Time In: 0930  Time Out: [facility],

## 2018-01-07 NOTE — PROGRESS NOTES
Problem: Mobility Impaired (Adult and Pediatric)  Goal: *Acute Goals and Plan of Care (Insert Text)  GOALS (1-4 days):  (1.)  Patient will move from supine to sit and sit to supine  in bed with STAND BY ASSIST.    (2.)  Patient will transfer from bed to chair and chair to bed with STAND BY ASSIST using the least restrictive device. (3.)  Patient will ambulate with STAND BY ASSIST for 250 feet with the least restrictive device. (4.)  Patient will be independent with shoulder HEP to increase range of motion per MD orders. ________________________________________________________________________________________________    PHYSICAL THERAPY: Daily Note, PM 1/7/2018  INPATIENT: Hospital Day: 3  [place] / Product Type: Medicare /      NAME/AGE/GENDER: Claudia Dee is a 79 y.o. female   PRIMARY DIAGNOSIS: Rotator cuff arthropathy, right [M12.811] Rotator cuff tear arthropathy, right Rotator cuff tear arthropathy, right  Procedure(s) (LRB):             REVERSE RIGHT TOTAL SHOULDER ARTHROPLASTY WITH DELTA EXTEND PROSTHESIS, BICEPS TENODESIS, LATISSIMUS DORSI AND TERES MAJOR TENDON TRANSFER RIGHT SHOULDER (Right)  2 Days Post-Op  ICD-10: Treatment Diagnosis:   · Pain in Right Shoulder (M25.511)  · Stiffness of Right Shoulder, Not elsewhere classified (M25.611)  · Generalized Muscle Weakness (M62.81)  · Difficulty in walking, Not elsewhere classified (R26.2)   Precaution/Allergies:  Pcn [penicillins] and Sulfa (sulfonamide antibiotics)      ASSESSMENT:     Ms. Mahnaz Lechuga presents s/p R reverse total shoulder on 1/5/18. She is up in bed upon arrival and eager to participate in therapy. She has no complaints of pain. Patient with history of TBI with L LE weakness and requires and AFO for ambulation. She reports good progress with outpatient rehab and ambulating with a straight cane prior to R shoulder surgery.  Patient able to move from supine to sit edge of bed with min assist. Stood at bedside for 5 minutes with no complaints of dizziness or problems. Able to perform exercises edge of bed prior to returning to bed. Once in bed, PT and patient's family member helped her scoot up in bed. After transfer, patient nauseated and vomited. Reported feeling better afterwards with no complaints. Patient plans to discharge home with assistance and states her  is setting up someone to come stay with her at home. She would benefit from continued skilled therapy through duration of hospital stay and home health physical therapy upon discharge home. Patient reports similar pain and itching to this morning. Able to ambulate with moderate steadiness using milly walker. She demonstrates increased confidence with movement today. This section established at most recent assessment   PROBLEM LIST (Impairments causing functional limitations):  1. Decreased [place] with Bed Mobility  2. Decreased [place] with Transfers  3. Decreased [place] with Ambulation   4. Decreased [place] with shoulder HEP   INTERVENTIONS PLANNED: (Benefits and precautions of physical therapy have been discussed with the patient.)  1. Bed Mobility Training  2. Transfer Training  3. Gait Training  4. Therapeutic Exercises per MD orders  5. Modalities for Pain     TREATMENT PLAN: Frequency/Duration: twice daily for duration of hospital stay  Rehabilitation Potential For Stated Goals: Good     RECOMMENDED REHABILITATION/EQUIPMENT: (at time of discharge pending progress): Continue Skilled Therapy and Home Health: Physical Therapy. HISTORY:   History of Present Injury/Illness (Reason for Referral):  S/p reverse R TSA on 1/5/18. Past Medical History/Comorbidities:   Ms. Paschal Kanner  has a past medical history of Anemia; Arthritis; Chronic sinusitis; GERD (gastroesophageal reflux disease);  Muscle weakness; Nausea & vomiting; Panhypopituitarism (HCC) (02/09/2017); SAH (subarachnoid hemorrhage) ([facility] [address] (02/09/2017); Secondary adrenal insufficiency ([facility] Secondary hypothyroidism; and Vitamin D deficiency. She also has no past medical history of Adverse effect of anesthesia; Aneurysm ([facility] Arrhythmia; Asthma; CAD (coronary artery disease); Cancer ([facility] [facility] Chronic kidney disease; Chronic obstructive pulmonary disease ([facility] [facility] Chronic pain; Coagulation disorder ([facility] [address] [facility] Diabetes ([facility] [address] [facility] Difficult intubation; Endocarditis; Heart failure ([facility] Hypertension; Ill-defined condition; Liver disease; Malignant hyperthermia due to anesthesia; Nicotine vapor product user; Non-nicotine vapor product user; Pseudocholinesterase deficiency; Psychiatric disorder; PUD (peptic ulcer disease); Rheumatic fever; Seizures ([facility] Sleep apnea; Stroke [facility]); or Thromboembolus ([facility] Ms. Sherel Bloch  has a past surgical history that includes hx  section and hx rotator cuff repair (Right). Social History/Living Environment:   Home Environment: Private residence  One/Two Story Residence: Two story  # of Interior Steps:  (chair lift)  Lift Chair Available: Yes  Living Alone: No  Support Systems: Spouse/Significant Other/Partner  Patient Expects to be Discharged to[de-identified] Private residence  Current DME Used/Available at Home: Neisha Pierson, anali, Walker, rolling  Prior Level of Function/Work/Activity:  Patient with prior TBI and L LE weakness. L AFO for ambulation and reports being in outpatient rehabilitation prior to shoulder surgery. Was ambulating with straight cane prior to R shoulder surgery. Number of Personal Factors/Comorbidities that affect the Plan of Care: 3+: HIGH COMPLEXITY   EXAMINATION:   Most Recent Physical Functioning:   Gross Assessment:  AROM: Generally decreased, functional  Strength: Generally decreased, functional   RUE PROM  R Shoulder Flexion: 15        RUE Strength  R Elbow Flexion: 2  R : 3  Posture:     Balance:  Sitting: Intact  Standing: Pull to stand; With support Bed Mobility:  Supine to Sit: Minimum assistance  Sit to Supine: Minimum assistance  Wheelchair Mobility:     Transfers:  Sit to Stand: Minimum assistance  Stand to Sit: Stand-by asssistance  Gait:     Speed/Magui: Shuffled; Slow  Step Length: Left shortened;Right shortened  Gait Abnormalities: Decreased step clearance; Step to gait  Distance (ft): 20 Feet (ft)  Assistive Device: Walker milly  Ambulation - Level of Assistance: Contact guard assistance  Interventions: Safety awareness training;Verbal cues   Strength:          L LE weakness with drop foot and AFO required for ambulation   Body Structures Involved:  1. Bones  2. Joints  3. Muscles  4. Ligaments Body Functions Affected:  1. Sensory/Pain  2. Neuromusculoskeletal  3. Movement Related Activities and Participation Affected:  1. General Tasks and Demands  2. Mobility  3. Self Care  4. Domestic Life  5. Interpersonal Interactions and Relationships  6. Community, Social and [place]   Number of elements that affect the Plan of Care: 3: MODERATE COMPLEXITY   CLINICAL PRESENTATION:   Presentation: Evolving clinical presentation with changing clinical characteristics: MODERATE COMPLEXITY   CLINICAL DECISION MAKIN [facility] Short Form  How much difficulty does the patient currently have. .. Unable A Lot A Little None   1. Turning over in bed (including adjusting bedclothes, sheets and blankets)? [] 1   [x] 2   [] 3   [] 4   2. Sitting down on and standing up from a chair with arms ( e.g., wheelchair, bedside commode, etc.)   [] 1   [] 2   [x] 3   [] 4   3. Moving from lying on back to sitting on the side of the bed? [] 1   [] 2   [x] 3   [] 4   How much help from another person does the patient currently need. .. Total A Lot A Little None   4. Moving to and from a bed to a chair (including a wheelchair)? [] 1   [] 2   [x] 3   [] 4   5. Need to walk in hospital room? [] 1   [x] 2   [] 3   [] 4   6. Climbing 3-5 steps with a railing?    [] 1   [x] 2   [] 3   [] 4   © 2007, Trustees of [address] [place] [address] [address] [id number], under license to [employer]. All rights reserved      Score:  Initial: 15 Most Recent: X (Date: -- )    Interpretation of Tool:  Represents activities that are increasingly more difficult (i.e. Bed mobility, Transfers, Gait). Score 24 23 22-20 19-15 14-10 9-7 6     Modifier CH CI CJ CK CL CM CN      ? Mobility - Walking and Moving Around:     - CURRENT STATUS: CK - 40%-59% impaired, limited or restricted    - GOAL STATUS: CJ - 20%-39% impaired, limited or restricted    - D/C STATUS:  ---------------To be determined---------------  [place] / [place] / Product Type: Medicare /      Medical Necessity:     · Patient is expected to demonstrate progress in strength, range of motion and functional technique to increase independence with ADLs and transfers and improve safety during ambulation and transfers. · Skilled intervention continues to be required due to impaired mobility s/p R reverese TSA. Reason for Services/Other Comments:  · Patient continues to require skilled intervention due to decreased mobility and independence s/p R reverse total shoudler surgery. Use of outcome tool(s) and clinical judgement create a POC that gives a: Questionable prediction of patient's progress: MODERATE COMPLEXITY            TREATMENT:   (In addition to Assessment/Re-Assessment sessions the following treatments were rendered)   Pre-treatment Symptoms/Complaints:  Patient reports she has a lot of itching this afternoon. Pain: Initial:   Pain Intensity 1: 4  Pain Location 1: Shoulder  Pain Intervention(s) 1: Exercise, Ice, Repositioned  Post Session:  5/10   Therapeutic Activity: (    15): Therapeutic activities including Bed transfers, chair transfers, and Ambulation on level ground to improve mobility, strength, balance and coordination.   Required minimal Safety awareness training;Verbal cues to promote static and dynamic balance in standing. Therapeutic Exercise: (25 Minutes):  Exercises per grid below to improve mobility, strength and coordination. Required minimal verbal cues to promote proper body alignment, promote proper body posture and promote proper body mechanics. Progressed resistance, range, repetitions and complexity of movement as indicated. Date:  1/6/18 Date:  1/7/18 Date:     ACTIVITY/EXERCISE AM PM AM PM AM PM   Gripping 10A 15 A 15 A 15 A     Wrist Flexion/Extension 10A 15 A 15 A 15 A     Wrist Ulnar/Radial Deviation         Pronation/Supination 10A 15 A 15 A 15 A     Elbow Flexion/Extension 10A 15 AA 15 AA 15 AA     Shoulder Flexion/Extension 10P 15 P  15 P     Shoulder AB/ADduction         Shoulder IR/ER         Pulleys         Pendulums  30 30 30     Shrugs  15 A 15 A 15 A     Isometric:                 Flexion         Extension         ABduction         ADduction         Biceps/Triceps                  B = bilateral; AA = active assistive; A = active; P = passive  Education:  [x]  Home Exercises  [x]  Sling Application   []  Movement Precautions   []  Pulleys   [x]  Use of Ice   []  Other:   Treatment/Session Assessment:    · Response to Treatment:  Patient able ambulate with decreased assistance using milly-walker and AFO this afternoon. · Interdisciplinary Collaboration:   o Physical Therapist  o Registered Nurse  · After treatment position/precautions:   o Up in chair  o Bed/Chair-wheels locked  o Caregiver at bedside  o Call light within reach  o RN notified   · Compliance with Program/Exercises: compliant all the time. · Recommendations/Intent for next treatment session: \"Next visit will focus on advancements to more challenging activities and reduction in assistance provided\".   Total Treatment Duration:  PT Patient Time In/Time Out  Time In: 1300  Time Out: 406 [facility]

## 2018-01-07 NOTE — PROGRESS NOTES
Orthopedic Progress Note    2018  Admit Date: 2018  Admit Diagnosis: Rotator cuff arthropathy, right [M12.811]    Post Op day: 2 Days Post-Op    Subjective:     Carlota Scale     Patient appears to be doing okay       Objective:     Vital Signs:    Temp (24hrs), Av.4 °F (36.9 °C), Min:98 °F (36.7 °C), Max:98.9 °F (37.2 °C)      LAB:    [unfilled]  Lab Results   Component Value Date/Time    INR 0.9 2018 01:15 PM     Lab Results   Component Value Date/Time    HGB 10.7 2018 04:58 AM    HGB 11.2 2018 04:02 AM       Physical Exam:    Patient appears to be A/O  No apparent distress   No neurovascular issues noted   No obvious problems      Plan:     Continue PT/OT rehab as indicated    Nursing and SS for disposition plans         Signed By: Anson Galdamez MD

## 2018-01-07 NOTE — PROGRESS NOTES
Oxygen rounds:     01/07/18 8613   Oxygen Therapy   O2 Sat (%) 94 %   Pulse via Oximetry 69 beats per minute   O2 Device Nasal cannula   O2 Flow Rate (L/min) 2 l/min  (Decreased to 1L )

## 2018-01-08 VITALS
SYSTOLIC BLOOD PRESSURE: 113 MMHG | HEART RATE: 63 BPM | TEMPERATURE: 98.9 F | OXYGEN SATURATION: 94 % | DIASTOLIC BLOOD PRESSURE: 64 MMHG | RESPIRATION RATE: 14 BRPM

## 2018-01-08 PROBLEM — M12.811 ROTATOR CUFF TEAR ARTHROPATHY OF RIGHT SHOULDER: Status: RESOLVED | Noted: 2018-01-05 | Resolved: 2018-01-08

## 2018-01-08 PROBLEM — M75.101 ROTATOR CUFF TEAR ARTHROPATHY OF RIGHT SHOULDER: Status: RESOLVED | Noted: 2018-01-05 | Resolved: 2018-01-08

## 2018-01-08 LAB
ABO + RH BLD: NORMAL
ANION GAP SERPL CALC-SCNC: 5 MMOL/L (ref 7–16)
ANTIGENS PRESENT BLD: NORMAL
ANTIGENS PRESENT RBC DONR: NORMAL
ANTIGENS PRESENT RBC DONR: NORMAL
BACTERIA SPEC CULT: ABNORMAL
BLD PROD TYP BPU: NORMAL
BLD PROD TYP BPU: NORMAL
BLOOD BANK CMNT PATIENT-IMP: NORMAL
BLOOD GROUP ANTIBODIES SERPL: NORMAL
BLOOD GROUP ANTIBODIES SERPL: NORMAL
BPU ID: NORMAL
BPU ID: NORMAL
BUN SERPL-MCNC: 11 MG/DL (ref 8–23)
CALCIUM SERPL-MCNC: 8.9 MG/DL (ref 8.3–10.4)
CHLORIDE SERPL-SCNC: 103 MMOL/L (ref 98–107)
CO2 SERPL-SCNC: 30 MMOL/L (ref 21–32)
CREAT SERPL-MCNC: 0.71 MG/DL (ref 0.6–1)
CROSSMATCH RESULT,%XM: NORMAL
CROSSMATCH RESULT,%XM: NORMAL
ERYTHROCYTE [DISTWIDTH] IN BLOOD BY AUTOMATED COUNT: 13.5 % (ref 11.9–14.6)
GLUCOSE SERPL-MCNC: 108 MG/DL (ref 65–100)
HCT VFR BLD AUTO: 30.5 % (ref 35.8–46.3)
HGB BLD-MCNC: 9.7 G/DL (ref 11.7–15.4)
MAGNESIUM SERPL-MCNC: 1.8 MG/DL (ref 1.8–2.4)
MCH RBC QN AUTO: 30.3 PG (ref 26.1–32.9)
MCHC RBC AUTO-ENTMCNC: 31.8 G/DL (ref 31.4–35)
MCV RBC AUTO: 95.3 FL (ref 79.6–97.8)
MM INDURATION POC: NORMAL MM (ref 0–5)
PLATELET # BLD AUTO: 202 K/UL (ref 150–450)
PMV BLD AUTO: 11.1 FL (ref 10.8–14.1)
POTASSIUM SERPL-SCNC: 4.9 MMOL/L (ref 3.5–5.1)
PPD POC: NORMAL NEGATIVE
RBC # BLD AUTO: 3.2 M/UL (ref 4.05–5.25)
SERVICE CMNT-IMP: ABNORMAL
SODIUM SERPL-SCNC: 138 MMOL/L (ref 136–145)
SPECIMEN EXP DATE BLD: NORMAL
STATUS OF UNIT,%ST: NORMAL
STATUS OF UNIT,%ST: NORMAL
UNIT DIVISION, %UDIV: 0
UNIT DIVISION, %UDIV: 0
WBC # BLD AUTO: 9.8 K/UL (ref 4.3–11.1)
WEAK D AG RBC QL: NORMAL

## 2018-01-08 PROCEDURE — 80048 BASIC METABOLIC PNL TOTAL CA: CPT | Performed by: ORTHOPAEDIC SURGERY

## 2018-01-08 PROCEDURE — 97530 THERAPEUTIC ACTIVITIES: CPT

## 2018-01-08 PROCEDURE — 36415 COLL VENOUS BLD VENIPUNCTURE: CPT | Performed by: ORTHOPAEDIC SURGERY

## 2018-01-08 PROCEDURE — 85027 COMPLETE CBC AUTOMATED: CPT | Performed by: ORTHOPAEDIC SURGERY

## 2018-01-08 PROCEDURE — 74011250637 HC RX REV CODE- 250/637: Performed by: ORTHOPAEDIC SURGERY

## 2018-01-08 PROCEDURE — 74011250637 HC RX REV CODE- 250/637: Performed by: FAMILY MEDICINE

## 2018-01-08 PROCEDURE — 97110 THERAPEUTIC EXERCISES: CPT

## 2018-01-08 PROCEDURE — 83735 ASSAY OF MAGNESIUM: CPT | Performed by: ORTHOPAEDIC SURGERY

## 2018-01-08 RX ADMIN — FERROUS SULFATE TAB 325 MG (65 MG ELEMENTAL FE) 325 MG: 325 (65 FE) TAB at 09:06

## 2018-01-08 RX ADMIN — HYDROMORPHONE HYDROCHLORIDE 2 MG: 4 TABLET ORAL at 07:03

## 2018-01-08 RX ADMIN — HYDROMORPHONE HYDROCHLORIDE 2 MG: 4 TABLET ORAL at 09:08

## 2018-01-08 RX ADMIN — POTASSIUM CHLORIDE 40 MEQ: 20 TABLET, EXTENDED RELEASE ORAL at 09:07

## 2018-01-08 RX ADMIN — DIPHENHYDRAMINE HYDROCHLORIDE 25 MG: 25 CAPSULE ORAL at 09:08

## 2018-01-08 RX ADMIN — LEVOTHYROXINE SODIUM 50 MCG: 50 TABLET ORAL at 07:03

## 2018-01-08 RX ADMIN — PRAMIPEXOLE DIHYDROCHLORIDE 0.5 MG: 0.25 TABLET ORAL at 09:06

## 2018-01-08 RX ADMIN — DOCUSATE SODIUM 100 MG: 100 CAPSULE, LIQUID FILLED ORAL at 09:08

## 2018-01-08 RX ADMIN — LEVOFLOXACIN 500 MG: 500 TABLET, FILM COATED ORAL at 09:07

## 2018-01-08 RX ADMIN — HYDROMORPHONE HYDROCHLORIDE 2 MG: 4 TABLET ORAL at 02:23

## 2018-01-08 RX ADMIN — HYDROMORPHONE HYDROCHLORIDE 2 MG: 4 TABLET ORAL at 12:45

## 2018-01-08 RX ADMIN — HYDROCORTISONE 30 MG: 20 TABLET ORAL at 07:03

## 2018-01-08 NOTE — PROGRESS NOTES
HH arranged by weekend SW. [facility] aware of orders and will follow at home.   Falguni Stewart

## 2018-01-08 NOTE — OP NOTES
[address] REPORT    Samantha Russell  MR#: [id number]  : 1950  ACCOUNT #: [de-identified]   DATE OF SERVICE: 2018    PREOPERATIVE DIAGNOSES:  1. Rotator cuff tear arthropathy, right shoulder with pseudoparasis and a CLEER deformity. POSTOPERATIVE DIAGNOSES:    1. Rotator cuff tear arthropathy, right shoulder with pseudoparasis and a CLEER deformity. PROCEDURE PERFORMED:  Reverse right shoulder arthroplasty with a Delta XTEND PROSTHESIS, BICEPS TENODESIS, latissimus dorsi and teres major tendon transfer. SURGEON:  Janel Way. Janet Meng MD     PATHOLOGY:    1.  Rotator cuff tear arthropathy. CPT CODES:  [id number] and [id number]. ICD-10 CODES:  M12.81     HARDWARE:  DePuy metaglene 38 eccentric glenosphere, 38+6 cup, 8/1 stem, 8 mm biceps Biostop G, 48 mm locking screw superior 48 mm cortical screw inferior. INDICATIONS:  The patient is a 78-year-old female with a complex history. The patient has undergone a previous surgical procedure to the right shoulder, which included a rotator cuff repair. 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 Patient also has had adrenal failure, is on stress dose steroids and thyroid replacement. The patient has developed severe right shoulder pain, weakness and inability to elevate the right arm. Preoperative physical exam and radiographic studies are consistent with rotator cuff tear arthropathy, right shoulder. The patient was electively admitted for operative intervention. PROCEDURE:  Following identification of the patient, the patient taken to the operative suite. Following induction of general anesthesia, interscalene block for postop pain control, 1 gram of IV vancomycin as well as Ancef, placement of the Ignacio catheter, the patient was then very carefully positioned on the operative table in the beach chair fashion.   The right shoulder was then prepped and draped in sterile fashion. A standard deltopectoral incision identified and marked. InteguSeal was applied. Once InteguSeal was allowed to cure, the skin was incised, subcutaneous tissue was then dissected down to the cephalic vein. This was dissected proximally and distally, retracted laterally. The deltoid, pec major and strap muscles retracted medially. Biceps tendon was identified and dissected up through the rotator interval.  It was tagged, transected for later tenodesis, it was completely mobilized and brought out to length. The subscapularis was identified and was released off the lesser tuberosity and tagged. Supra and infraspinatus were absent. The humerus was very carefully dislocated from the wound. There is also poor quality teres minor as well and marked degenerative change on the humeral head. Proximal cutting guide was assembled. Proximal cut was then made. Circumferential osteophytes were resected. Glenoid was then exposed meticulously. There were marked degenerative change in the glenoid as well. Starting wire was then drilled, glenoid was then drilled and reamed and metaglene was inserted and secured with a 40 mm superior locking screw 48 mm inferior cortical screw. There was inadequate bone stock for an anterior or posterior screw. Metaglene was secured and stable. A 38 eccentric glenosphere was inserted and secured in standard fashion. Metaglene and glenosphere were stable. Humerus was dislocated back from the wound and reamed to accommodate an 8/1 stem. It was noted that an 81 stem with a 38 +6 cup gave excellent stability and mobility. At this point, all trial components were then removed. The construct and teres major tendons were then identified. They were released off the humeral shaft. They were tagged and mobilized posterior for later transfer. Axillary and radial nerves were protected. At this point, the humeral canal was irrigated, dried.   An 8 mm Biostop G was then placed distally. Antibiotic-impregnated cement was mixed and inserted in the humeral canal.  An 8/1 stem was cemented in appropriate version. Excess cement was removed. Once the cement was allowed to cure, the true 38+6 cup was inserted. Shoulder was reduced. There was excellent stability and excellent mobility. At this point, latissimus dorsi and teres major tendons were then transferred posteriorly and secured with 5 and #2 Mersilene sutures. The biceps was seen using #5 Mersilene suture. The arm was put through range of motion and stable as her radial nerves were protected. At this point, the wound was then irrigated. The deltopectoral interval was reapproximated with #5 Mersilene sutures. Skin was closed with 0 Vicryl figure-of-eight sutures and a 2-0 Prolene subcuticular stitch. A sterile dressing was applied. Sling and swath was applied. The patient was then transferred to the recovery room in stable condition.       MD RENA Cooper / MILIND  D: 01/05/2018 16:34     T: 01/05/2018 17:50  JOB #: [id number]

## 2018-01-08 NOTE — DISCHARGE INSTRUCTIONS
DISCHARGE SUMMARY from Nurse    The following personal items collected during your admission are returned to you:   Dental Appliance: Dental Appliances: Lowers; Other (comment) (bridge )  Vision: Visual Aid: Glasses  Hearing Aid:   na  Jewelry: Jewelry: None  Clothing: Clothing: At bedside  Other Valuables: Other Valuables: None  Valuables sent to safe:   na          PATIENT INSTRUCTIONS:    New Medications:    Dilaudid 2 mg tabs Take 1-2 tabs every 4-6 hrs as needed for pain. Phenergan 25 mg tabs Take 1 tab every 8 hrs as needed for nausea. Restoril 15 mg tabs Take 1 tab at bedtime as needed for sleep. Levaquin 500 mg tabs Take 1 a day x 10 days. After general anesthesia or intravenous sedation, for 24 hours or while taking prescription Narcotics:  · Limit your activities  · Do not drive and operate hazardous machinery  · Do not make important personal or business decisions  · Do  not drink alcoholic beverages  · If you have not urinated within 8 hours after discharge, please contact your surgeon on call. Report the following to your surgeon:  · Excessive pain, swelling, redness or odor of or around the surgical area  · Temperature over 101  · Nausea and vomiting lasting longer than 4 hours or if unable to take medications  · Any signs of decreased circulation or nerve impairment to extremity: change in color, persistent  numbness, tingling, coldness or increase pain  · Any questions, call office @ [phone]. What to do at Home:  Recommended activity: activity as tolerated, as instructed per Dr. Pau Chairez. Continue with exercises taught by Physical Therapy. Resume per hospital diet. Wear sling to right arm. Use ice and elevate arm to decrease pain and swelling. If you experience any of the following symptoms temp>101, pain unrelieved by meds, or persistent nausea or vomitting, please follow up with Dr. Pau Chairez @ [phone].       *  Please give a list of your current medications to your Primary Care Provider. *  Please update this list whenever your medications are discontinued, doses are      changed, or new medications (including over-the-counter products) are added. *  Please carry medication information at all times in case of emergency situations. Shoulder Replacement Surgery: What to Expect at Home  Your Recovery  Shoulder replacement surgery replaces the worn parts of your shoulder joint. When you leave the hospital, your arm will be in a sling. It will be helpful if there is someone to help you at home for the next few weeks or until you have more energy and can move around better. You will go home with a bandage and stitches or staples. You can remove the bandage when your doctor tells you to. If the stitches are not the type that dissolve, your doctor will remove them in 10 to 14 days. You may still have some mild pain, and the area may be swollen for several months after surgery. Your doctor will give you medicine for the pain. You will continue the rehabilitation program (rehab) you started in the hospital. The better you do with your rehab exercises, the sooner you will get your strength and movement back. Depending on your job, you may be able to return to work as early as 2 to 3 weeks after surgery, as long as you avoid certain arm movements, such as lifting. This care sheet gives you a general idea about how long it will take for you to recover. But each person recovers at a different pace. Follow the steps below to get better as quickly as possible. How can you care for yourself at home? Activity  · Rest when you feel tired. You may take a nap, but don't stay in bed all day. · Work with your physical therapist to learn the best way to exercise. · You will have a sling to wear at night. And it's a good idea to also put a small stack of folded sheets or towels under your upper arm while you are in bed to keep your arm from dropping too far back.   · Your arm should stay next to your body or in front of it for several weeks, both while you are up and during sleep. · Don't lift anything with the affected arm for 6 weeks. · You may need to take sponge baths until your stitches or staples have been removed. You will probably be able to shower 24 hours after they are removed. · Ask your doctor when you can drive again. · Ask your doctor when it is okay for you to have sex. · Your doctor may advise you to give up activities that put stress on that shoulder. This includes sports such as weight lifting or tennis, unless your tennis arm was not the one affected. Diet  · By the time you leave the hospital, you will probably be eating your normal diet. If your stomach is upset, try bland, low-fat foods like plain rice, broiled chicken, toast, and yogurt. Your doctor may recommend that you take iron and vitamin supplements. · Drink plenty of fluids (unless your doctor tells you not to). · You may notice that your bowel movements are not regular right after your surgery. This is common. Try to avoid constipation and straining with bowel movements. You may want to take a fiber supplement every day. If you have not had a bowel movement after a couple of days, ask your doctor about taking a mild laxative. Medicines  · Be safe with medicines. Take pain medicines exactly as directed. ¨ If the doctor gave you a prescription medicine for pain, take it as prescribed. ¨ If you are not taking a prescription pain medicine, ask your doctor if you can take an over-the-counter medicine. · If you think your pain medicine is making you sick to your stomach:  ¨ Take your medicine after meals (unless your doctor has told you not to). ¨ Ask your doctor for a different pain medicine. · If your doctor prescribed antibiotics, take them as directed. Don't stop taking them just because you feel better. You need to take the full course of antibiotics.   · If you take a blood thinner, be sure you get instructions about how to take your medicine safely. Blood thinners can cause serious bleeding problems. Incision care  · You will have a dressing over the cut (incision). A dressing helps the incision heal and protects it. Your doctor will tell you how to take care of this. · Keep the area clean and dry. Exercise  · Shoulder rehabilitation is a series of exercises you do after your surgery. This helps you get back your shoulder's range of motion and strength. You will work with your doctor and physical therapist to plan this exercise program. To get the best results, you need to do the exercises correctly and as often and as long as your doctor tells you. Ice  · For pain, put ice or a cold pack on the area for 10 to 20 minutes at a time. Put a thin cloth between the ice and your skin. Other instructions  · Wear medical alert jewelry that says you may need antibiotics before any procedure, including dental work. You can buy this at most drugstores. Follow-up care is a key part of your treatment and safety. Be sure to make and go to all appointments, and call your doctor if you are having problems. It's also a good idea to know your test results and keep a list of the medicines you take. When should you call for help? Call 911 anytime you think you may need emergency care. For example, call if:  · You have severe trouble breathing. · You have symptoms of a blood clot in your lung (called a pulmonary embolism). These may include:  ¨ Sudden chest pain. ¨ Trouble breathing. ¨ Coughing up blood. Call your doctor now or seek immediate medical care if:  · You have severe or increasing pain. · You have symptoms of infection, such as:  ¨ Increased pain, swelling, warmth, or redness. ¨ Red streaks or pus. ¨ A fever. · You have tingling, weakness, or numbness in your arm. · Your arm turns cold or changes color. · You have symptoms of a blood clot in your leg (called a deep vein thrombosis).  These may include:  ¨ Pain in the calf, back of the knee, thigh, or groin. ¨ Redness and swelling in the leg or groin. Watch closely for changes in your health, and be sure to contact your doctor if:  · You do not get better as expected. Where can you learn more? Go to [employer]  Enter I201 in the search box to learn more about \"Shoulder Replacement Surgery: What to Expect at Home. \"   © 2021-4942 [employer]. Care instructions adapted under license by [employer] (which disclaims liability or warranty for this information). This care instruction is for use with your licensed healthcare professional. If you have questions about a medical condition or this instruction, always ask your healthcare professional. [address] any warranty or liability for your use of this information. Content Version: 6.1.794072; Last Revised: August 6, 2013                These are general instructions for a healthy lifestyle:    No smoking/ No tobacco products/ Avoid exposure to second hand smoke    Surgeon General's Warning:  Quitting smoking now greatly reduces serious risk to your health. Obesity, smoking, and sedentary lifestyle greatly increases your risk for illness    A healthy diet, regular physical exercise & weight monitoring are important for maintaining a healthy lifestyle    You may be retaining fluid if you have a history of heart failure or if you experience any of the following symptoms:  Weight gain of 3 pounds or more overnight or 5 pounds in a week, increased swelling in our hands or feet or shortness of breath while lying flat in bed. Please call your doctor as soon as you notice any of these symptoms; do not wait until your next office visit.     Recognize signs and symptoms of STROKE:    F-face looks uneven    A-arms unable to move or move unevenly    S-speech slurred or non-existent    T-time-call 911 as soon as signs and symptoms begin-DO NOT go       Back to bed or wait to see if you get better-TIME IS BRAIN. The discharge information has been reviewed with the patient. The patient verbalized understanding.

## 2018-01-08 NOTE — PROGRESS NOTES
Shift assessment completed. Patient resting in bed. Patient has +2 radial and pedal pulses. Patient instructed to use call light for needs. Patient verbalized understanding. Bed low and locked. Call light within reach. Patient denies needs at this time. Will monitor.

## 2018-01-08 NOTE — PROGRESS NOTES
Orthopedic Joint Progress Note    2018  Admit Date: 2018  Admit Diagnosis: Rotator cuff arthropathy, right [M12.811]    3 Days Post-Op    Subjective: Some pain     Courtney Ross     Review of Systems: Pertinent items are noted in HPI. Objective:     PT/OT:     PATIENT MOBILITY    Bed Mobility  Supine to Sit: Minimum assistance  Sit to Supine: Minimum assistance  Scooting: Moderate assistance, Assist x2 (scooting up in bed)  Transfers  Sit to Stand: Minimum assistance  Stand to Sit: Stand-by asssistance      Gait  Speed/Magui: Shuffled, Slow  Step Length: Left shortened, Right shortened  Gait Abnormalities: Decreased step clearance, Step to gait  Ambulation - Level of Assistance: Contact guard assistance  Distance (ft): 20 Feet (ft)  Assistive Device: Walker milly  Interventions: Safety awareness training, Verbal cues            Vital Signs:    Blood pressure 130/72, pulse 72, temperature 98.9 °F (37.2 °C), resp. rate 16, SpO2 98 %.   Temp (24hrs), Av.6 °F (37 °C), Min:98.1 °F (36.7 °C), Max:99.1 °F (37.3 °C)      Pain Control:   Pain Assessment  Pain Scale 1: Numeric (0 - 10)  Pain Intensity 1: 3  Pain Location 1: Shoulder  Pain Orientation 1: Right  Pain Description 1: Intermittent  Pain Intervention(s) 1: Medication (see MAR)    Meds:  Current Facility-Administered Medications   Medication Dose Route Frequency    hydrocortisone (CORTEF) tablet 30 mg  30 mg Oral ACB    hydrocortisone (CORTEF) tablet 15 mg  15 mg Oral QHS    sodium chloride (NS) flush 5-10 mL  5-10 mL IntraVENous Q8H    sodium chloride (NS) flush 5-10 mL  5-10 mL IntraVENous PRN    bisacodyl (DULCOLAX) suppository 10 mg  10 mg Rectal DAILY PRN    sodium phosphate (FLEET'S) enema 118 mL  1 Enema Rectal PRN    ferrous sulfate tablet 325 mg  1 Tab Oral BID WITH MEALS    docusate sodium (COLACE) capsule 100 mg  100 mg Oral BID    promethazine (PHENERGAN) tablet 25 mg  25 mg Oral Q4H PRN    temazepam (RESTORIL) capsule 15 mg  15 mg Oral QHS PRN    HYDROmorphone (DILAUDID) tablet 4 mg  4 mg Oral Q3H PRN    HYDROmorphone (PF) (DILAUDID) injection 1 mg  1 mg IntraVENous Q1H PRN    HYDROmorphone (DILAUDID) tablet 2 mg  2 mg Oral Q3H PRN    levoFLOXacin (LEVAQUIN) tablet 500 mg  500 mg Oral Q24H    potassium chloride (K-DUR, KLOR-CON) SR tablet 40 mEq  40 mEq Oral DAILY    levothyroxine (SYNTHROID) tablet 50 mcg  50 mcg Oral ACB    pramipexole (MIRAPEX) tablet 0.5 mg  0.5 mg Oral TID    guaiFENesin ER (MUCINEX) tablet 600 mg  600 mg Oral Q12H PRN    calcium carbonate (TUMS) chewable tablet 400 mg [elemental]  400 mg Oral TID PRN    diphenhydrAMINE (BENADRYL) capsule 25 mg  25 mg Oral Q4H PRN        LAB:    Lab Results   Component Value Date/Time    INR 0.9 01/04/2018 01:15 PM     Lab Results   Component Value Date/Time    HGB 9.7 01/08/2018 05:03 AM    HGB 10.7 01/07/2018 04:58 AM    HGB 11.2 01/06/2018 04:02 AM       Wound Shoulder Right (Active)   DRESSING STATUS Clean, dry, and intact 1/7/2018  8:45 PM   DRESSING TYPE 4 x 4;Transparent film 1/7/2018  8:45 PM   SPLINT TYPE/MATERIAL Sling 1/7/2018  8:45 PM   Dressing Type Applied 4 x 4;Transparent film 1/6/2018  7:46 PM   Procedure Tolerated Well 1/6/2018  2:08 PM   Number of days:3             Physical Exam:  No significant changes    Assessment:      Principal Problem:    Rotator cuff tear arthropathy, right (1/3/2018)    Active Problems:    Anemia (1/5/2018)      Urinary tract infection (1/5/2018)      Anemia associated with acute blood loss (1/5/2018)      Adrenal insufficiency (HCC) (1/5/2018)      Acquired hypothyroidism (1/5/2018)      Constipation (1/5/2018)      Sinus congestion (1/5/2018)         Plan:     Continue PT/OT/Rehab  Discharge home    Patient Expects to be Discharged to[de-identified] Private residence     Signed By: Diann Cast MD

## 2018-01-08 NOTE — PROGRESS NOTES
Problem: Mobility Impaired (Adult and Pediatric)  Goal: *Acute Goals and Plan of Care (Insert Text)  GOALS (1-4 days):  (1.)  Patient will move from supine to sit and sit to supine  in bed with STAND BY ASSIST.    (2.)  Patient will transfer from bed to chair and chair to bed with STAND BY ASSIST using the least restrictive device. (3.)  Patient will ambulate with STAND BY ASSIST for 250 feet with the least restrictive device. (4.)  Patient will be independent with shoulder HEP to increase range of motion per MD orders. ________________________________________________________________________________________________    PHYSICAL THERAPY: Daily Note, AM 1/8/2018  INPATIENT: Hospital Day: 4  [place] / [place] / Product Type: Medicare /      NAME/AGE/GENDER: Collins Bhardwaj is a 79 y.o. female   PRIMARY DIAGNOSIS: Rotator cuff arthropathy, right [M12.811] Rotator cuff tear arthropathy, right Rotator cuff tear arthropathy, right  Procedure(s) (LRB):             REVERSE RIGHT TOTAL SHOULDER ARTHROPLASTY WITH DELTA EXTEND PROSTHESIS, BICEPS TENODESIS, LATISSIMUS DORSI AND TERES MAJOR TENDON TRANSFER RIGHT SHOULDER (Right)  3 Days Post-Op  ICD-10: Treatment Diagnosis:   · Pain in Right Shoulder (M25.511)  · Stiffness of Right Shoulder, Not elsewhere classified (M25.611)  · Generalized Muscle Weakness (M62.81)  · Difficulty in walking, Not elsewhere classified (R26.2)   Precaution/Allergies:  Pcn [penicillins] and Sulfa (sulfonamide antibiotics)      ASSESSMENT:     Ms. Saida Marino presents s/p R reverse total shoulder on 1/5/18. She is up in bed upon arrival and eager to participate in therapy. She has no complaints of pain. Patient with history of TBI with L LE weakness and requires and AFO for ambulation. She reports good progress with outpatient rehab and ambulating with a straight cane prior to R shoulder surgery.  Patient able to move from supine to sit edge of bed with min assist. Stood at bedside for 5 minutes with no complaints of dizziness or problems. Able to perform exercises edge of bed prior to returning to bed. Once in bed, PT and patient's family member helped her scoot up in bed. After transfer, patient nauseated and vomited. Reported feeling better afterwards with no complaints. Patient plans to discharge home with assistance and states her  is setting up someone to come stay with her at home. She would benefit from continued skilled therapy through duration of hospital stay and home health physical therapy upon discharge home. Patient is supine upon arrival.  Therapist and pt performs exercises on the EOB with some pain. Review HEP, with all question answered. She continue to need Min A for bed mobility and sit to stand. She increase her distance using HHA with no LOB. She tried quad cane, but did not like it, because it made her feel unsafe. She return to bed with call light near,. This section established at most recent assessment   PROBLEM LIST (Impairments causing functional limitations):  1. Decreased [place] with Bed Mobility  2. Decreased [place] with Transfers  3. Decreased [place] with Ambulation   4. Decreased [place] with shoulder HEP   INTERVENTIONS PLANNED: (Benefits and precautions of physical therapy have been discussed with the patient.)  1. Bed Mobility Training  2. Transfer Training  3. Gait Training  4. Therapeutic Exercises per MD orders  5. Modalities for Pain     TREATMENT PLAN: Frequency/Duration: twice daily for duration of hospital stay  Rehabilitation Potential For Stated Goals: Good     RECOMMENDED REHABILITATION/EQUIPMENT: (at time of discharge pending progress): Continue Skilled Therapy and Home Health: Physical Therapy. HISTORY:   History of Present Injury/Illness (Reason for Referral):  S/p reverse R TSA on 1/5/18.    Past Medical History/Comorbidities:   Ms. Paulo Short  has a past medical history of Anemia; Arthritis; Chronic sinusitis; GERD (gastroesophageal reflux disease); Muscle weakness; Nausea & vomiting; Panhypopituitarism (HCC) (2017); SAH (subarachnoid hemorrhage) ([facility] (2017); Secondary adrenal insufficiency ([facility] Secondary hypothyroidism; and Vitamin D deficiency. She also has no past medical history of Adverse effect of anesthesia; Aneurysm ([facility] Arrhythmia; Asthma; CAD (coronary artery disease); Cancer ([facility] Chronic kidney disease; Chronic obstructive pulmonary disease ([facility] Chronic pain; Coagulation disorder ([facility] Diabetes ([facility] Difficult intubation; Endocarditis; Heart failure ([facility] Hypertension; Ill-defined condition; Liver disease; Malignant hyperthermia due to anesthesia; Nicotine vapor product user; Non-nicotine vapor product user; Pseudocholinesterase deficiency; Psychiatric disorder; PUD (peptic ulcer disease); Rheumatic fever; Seizures ([facility] Sleep apnea; Stroke [facility]); or Thromboembolus ([facility] Ms. Paschal Kanner  has a past surgical history that includes hx  section and hx rotator cuff repair (Right). Social History/Living Environment:   Home Environment: Private residence  One/Two Story Residence: Two story  # of Interior Steps:  (chair lift)  Lift Chair Available: Yes  Living Alone: No  Support Systems: Spouse/Significant Other/Partner  Patient Expects to be Discharged to[de-identified] Private residence  Current DME Used/Available at Home: Christean Filter, straight, Walker, rolling  Prior Level of Function/Work/Activity:  Patient with prior TBI and L LE weakness. L AFO for ambulation and reports being in outpatient rehabilitation prior to shoulder surgery. Was ambulating with straight cane prior to R shoulder surgery.    Number of Personal Factors/Comorbidities that affect the Plan of Care: 3+: HIGH COMPLEXITY   EXAMINATION:   Most Recent Physical Functioning:   Gross Assessment:                  Posture:     Balance:    Bed Mobility:  Supine to Sit: Minimum assistance  Sit to Supine: Minimum assistance  Duration: 23 Minutes  Wheelchair Mobility:     Transfers:  Sit to Stand: Minimum assistance  Stand to Sit: Minimum assistance  Gait:     Speed/Magui: Shuffled; Slow  Step Length: Left shortened;Right shortened  Gait Abnormalities: Decreased step clearance  Distance (ft): 30 Feet (ft)  Assistive Device: Other (comment) (HHA)  Ambulation - Level of Assistance: Minimal assistance  Interventions: Safety awareness training   Strength:          L LE weakness with drop foot and AFO required for ambulation   Body Structures Involved:  1. Bones  2. Joints  3. Muscles  4. Ligaments Body Functions Affected:  1. Sensory/Pain  2. Neuromusculoskeletal  3. Movement Related Activities and Participation Affected:  1. General Tasks and Demands  2. Mobility  3. Self Care  4. Domestic Life  5. Interpersonal Interactions and Relationships  6. Community, Social and [place]   Number of elements that affect the Plan of Care: 3: MODERATE COMPLEXITY   CLINICAL PRESENTATION:   Presentation: Evolving clinical presentation with changing clinical characteristics: MODERATE COMPLEXITY   CLINICAL DECISION MAKIN [facility] Inpatient Short Form  How much difficulty does the patient currently have. .. Unable A Lot A Little None   1. Turning over in bed (including adjusting bedclothes, sheets and blankets)? [] 1   [x] 2   [] 3   [] 4   2. Sitting down on and standing up from a chair with arms ( e.g., wheelchair, bedside commode, etc.)   [] 1   [] 2   [x] 3   [] 4   3. Moving from lying on back to sitting on the side of the bed? [] 1   [] 2   [x] 3   [] 4   How much help from another person does the patient currently need. .. Total A Lot A Little None   4. Moving to and from a bed to a chair (including a wheelchair)? [] 1   [] 2   [x] 3   [] 4   5. Need to walk in hospital room? [] 1   [x] 2   [] 3   [] 4   6. Climbing 3-5 steps with a railing?    [] 1   [x] 2   [] 3   [] 4   © 2007, Trustees of [address] [place] [address] [address] [address], under license to [employer]. All rights reserved      Score:  Initial: 15 Most Recent: X (Date: -- )    Interpretation of Tool:  Represents activities that are increasingly more difficult (i.e. Bed mobility, Transfers, Gait). Score 24 23 22-20 19-15 14-10 9-7 6     Modifier CH CI CJ CK CL CM CN      ? Mobility - Walking and Moving Around:     - CURRENT STATUS: CK - 40%-59% impaired, limited or restricted    - GOAL STATUS: CJ - 20%-39% impaired, limited or restricted    - D/C STATUS:  ---------------To be determined---------------  [place] / [place] / Product Type: Medicare /      Medical Necessity:     · Patient is expected to demonstrate progress in strength, range of motion and functional technique to increase independence with ADLs and transfers and improve safety during ambulation and transfers. · Skilled intervention continues to be required due to impaired mobility s/p R reverese TSA. Reason for Services/Other Comments:  · Patient continues to require skilled intervention due to decreased mobility and independence s/p R reverse total shoudler surgery. Use of outcome tool(s) and clinical judgement create a POC that gives a: Questionable prediction of patient's progress: MODERATE COMPLEXITY            TREATMENT:   (In addition to Assessment/Re-Assessment sessions the following treatments were rendered)   Pre-treatment Symptoms/Complaints:  Patient reports she has a lot of itching this afternoon. Pain: Initial:   Pain Intensity 1: 2 (4/10 after therapy)  Post Session:  5/10   Therapeutic Activity: (23 Minutes   15): Therapeutic activities including Bed transfers, chair transfers, and Ambulation on level ground to improve mobility, strength, balance and coordination. Required minimal Safety awareness training to promote static and dynamic balance in standing.        Therapeutic Exercise: (15 Minutes):  Exercises per grid below to improve mobility, strength and coordination. Required minimal verbal cues to promote proper body alignment, promote proper body posture and promote proper body mechanics. Progressed resistance, range, repetitions and complexity of movement as indicated. Date:  1/6/18 Date:  1/7/18 Date:  1/8     ACTIVITY/EXERCISE AM PM AM PM AM PM   Gripping 10A 15 A 15 A 15 A 15    Wrist Flexion/Extension 10A 15 A 15 A 15 A 15    Wrist Ulnar/Radial Deviation         Pronation/Supination 10A 15 A 15 A 15 A 15    Elbow Flexion/Extension 10A 15 AA 15 AA 15 AA 15 aa    Shoulder Flexion/Extension 10P 15 P  15 P 15 P    Shoulder AB/ADduction         Shoulder IR/ER         Pulleys         Pendulums  30 30 30 15    Shrugs  15 A 15 A 15 A 15    Isometric:                 Flexion         Extension         ABduction         ADduction         Biceps/Triceps                  B = bilateral; AA = active assistive; A = active; P = passive  Education:  [x]  Home Exercises  [x]  Sling Application   []  Movement Precautions   []  Pulleys   [x]  Use of Ice   []  Other:   Treatment/Session Assessment:    · Response to Treatment:  Patient able ambulate with decreased assistance using milly-walker and AFO this afternoon. · Interdisciplinary Collaboration:   o Registered Nurse  · After treatment position/precautions:   o Supine in bed  o Bed/Chair-wheels locked  o Caregiver at bedside  o Call light within reach  o RN notified   · Compliance with Program/Exercises: compliant all the time. · Recommendations/Intent for next treatment session: \"Next visit will focus on advancements to more challenging activities and reduction in assistance provided\".   Total Treatment Duration:  PT Patient Time In/Time Out  Time In: 0845  Time Out: [address] [facility] KOLE Hoskins

## 2018-01-08 NOTE — PROGRESS NOTES
01/07/18 2203   Oxygen Therapy   O2 Sat (%) 96 %   Pulse via Oximetry 76 beats per minute   O2 Device Room air   Patient on room air. O2 spot check complete. No distress at this time. IS use encouraged.

## 2018-01-09 ENCOUNTER — HOME CARE VISIT (OUTPATIENT)
Dept: SCHEDULING | Facility: HOME HEALTH | Age: 68
End: 2018-01-09
Payer: MEDICARE

## 2018-01-09 ENCOUNTER — PATIENT OUTREACH (OUTPATIENT)
Dept: CASE MANAGEMENT | Age: 68
End: 2018-01-09

## 2018-01-09 VITALS
BODY MASS INDEX: 23.41 KG/M2 | DIASTOLIC BLOOD PRESSURE: 64 MMHG | HEART RATE: 60 BPM | WEIGHT: 124 LBS | TEMPERATURE: 98.8 F | RESPIRATION RATE: 18 BRPM | HEIGHT: 61 IN | SYSTOLIC BLOOD PRESSURE: 118 MMHG

## 2018-01-09 PROCEDURE — A6402 STERILE GAUZE <= 16 SQ IN: HCPCS

## 2018-01-09 PROCEDURE — 3331090001 HH PPS REVENUE CREDIT

## 2018-01-09 PROCEDURE — 3331090002 HH PPS REVENUE DEBIT

## 2018-01-09 PROCEDURE — 400013 HH SOC

## 2018-01-09 PROCEDURE — G0151 HHCP-SERV OF PT,EA 15 MIN: HCPCS

## 2018-01-09 PROCEDURE — A6204 COMPOSITE DRSG >16<=48 SQ IN: HCPCS

## 2018-01-09 NOTE — PROGRESS NOTES
Discharge instructions given to patient with  listening in,,also given to  four prescriptions from physician (Dilaudid,Phenergan,Restoril and Levaquin tablets),,both given opportunity for questions and for clarifications  Advised to keep arm in sling,,dressing on right shoulder intact,clean and dry. Radha Funk

## 2018-01-09 NOTE — PROGRESS NOTES
Transition of Care Discharge Follow-up Questionnaire   Date/Time of Call:  1/9/18 11:05 am   What was the patient hospitalized for? R Rotator Cuff Arthropathy   Does the patient understand his/her diagnosis and/or treatment and what happened during the hospitalization? Yes patient verbalized understanding of treatments during hospitalization. Did the patient receive discharge instructions? yes   Review any discharge instructions (see notes in [facility]). Ask patient if they understand these. Do they have any questions? Patient verbalized understanding of all discharge orders   Were home services ordered (nursing, PT, OT, ST, etc.)? Yes   If so, has the first visit occurred? If not, why? (Assist with coordination of services if necessary.) Scheduled 1/9/18 at 12:00 pm   Was any DME ordered? No   If so, has it been received? If not, why?  (Assist with coordination of arranging DME orders if necessary.) N/A   Complete a review of all medications (new, continued and discontinued meds per the D/C instructions and medication tab in [facility]). Yes    Were all new prescriptions filled? If not, why?  (Assist with obtainment of medications if necessary.) Yes   Does the patient understand the purpose and dosing instructions for all medications? (If patient has questions, provide explanation and education.) Yes verbalized the purpose and dosing instructions for patients medications. Does the patient have any problems in performing ADLs? (If patient is unable to perform ADLs  what is the limiting factor(s)? Do they have a support system that can assist? If no support system is present, discuss possible assistance that they may be able to obtain.) Patient independent with ADLs.  available for assistance while recovering from procedure. Does the patient have all follow-up appointments scheduled? Has transportation been arranged?   [facility] Pulmonary follow-up should be within 7 days of discharge; all others should have PCP follow-up within 7 days of discharge; follow-ups with other specialists as appropriate or ordered.) Clarisse-Dr. Levy-1/17/18  Transportation arranged   Any other questions or concerns expressed by the patient? Patient had no questions at time of call. Contact information provided for any questions/concerns. Schedule next appointment with JONEL GONSALEZ Coordinator or refer to RN Case Manager/  as appropriate. YOBANY will follow up with patient per protocol. YOBANY Call Completed By:  Home Visit   Emanuel Kirby RN       This note will not be viewable in [address]

## 2018-01-09 NOTE — PROGRESS NOTES
Discharged per wheel chair,,stable condition,,accompanied by her  who is taking her home by car. .. Levorn Duffy Levorn Duffy Levorn Duffy

## 2018-01-09 NOTE — DISCHARGE SUMMARY
[address] [facility]  MR#: [id number]  : 1950  ACCOUNT #: [de-identified]   ADMIT DATE: 2018  DISCHARGE DATE: 2018    ADMISSION DIAGNOSIS:  Rotator cuff tear arthropathy, right shoulder. DISCHARGE DIAGNOSIS:  Rotator cuff tear arthropathy, right shoulder. Please see H and P, operative summaries and consults for details. HOSPITAL COURSE:  Patient is a 60-year-old female who was admitted on 2018, underwent an uncomplicated reverse right total shoulder arthroplasty with a Delta Xtend prosthesis, biceps tenodesis, latissimus dorsi and teres major tendon transfer. Immediately perioperatively a hospital consult was obtained to manage adrenal insufficiency. On postoperative day #1, hemoglobin was 11.2, potassium was 4.2, magnesium was 1.8. She was in pain. The Ignacio catheter was removed. She began her therapy as per our reverse shoulder arthroplasty protocol. On postoperative day #2, hemoglobin was 10.7, potassium is 3.7, magnesium was 2.0. On postoperative day #3, hemoglobin was 9.7, potassium and magnesium within normal limits. She was discharged home on postoperative day #3. She will continue therapy on the outside, and follow up in my office in 10 days. A hospitalist consult was obtained to manage her medical issues, including her adrenal insufficiency, during the course of her hospital stay.       Saintclair Hubert, MD AGP/FRANKY  D: 2018 20:30     T: 2018 21:47  JOB #: [id number]

## 2018-01-10 PROCEDURE — 3331090001 HH PPS REVENUE CREDIT

## 2018-01-10 PROCEDURE — 3331090002 HH PPS REVENUE DEBIT

## 2018-01-11 ENCOUNTER — HOME CARE VISIT (OUTPATIENT)
Dept: SCHEDULING | Facility: HOME HEALTH | Age: 68
End: 2018-01-11
Payer: MEDICARE

## 2018-01-11 VITALS — SYSTOLIC BLOOD PRESSURE: 110 MMHG | DIASTOLIC BLOOD PRESSURE: 64 MMHG | HEART RATE: 82 BPM | RESPIRATION RATE: 18 BRPM

## 2018-01-11 PROCEDURE — G0151 HHCP-SERV OF PT,EA 15 MIN: HCPCS

## 2018-01-11 PROCEDURE — 3331090002 HH PPS REVENUE DEBIT

## 2018-01-11 PROCEDURE — 3331090001 HH PPS REVENUE CREDIT

## 2018-01-12 ENCOUNTER — HOME CARE VISIT (OUTPATIENT)
Dept: SCHEDULING | Facility: HOME HEALTH | Age: 68
End: 2018-01-12
Payer: MEDICARE

## 2018-01-12 VITALS — DIASTOLIC BLOOD PRESSURE: 55 MMHG | SYSTOLIC BLOOD PRESSURE: 110 MMHG | HEART RATE: 76 BPM | RESPIRATION RATE: 18 BRPM

## 2018-01-12 PROCEDURE — 3331090002 HH PPS REVENUE DEBIT

## 2018-01-12 PROCEDURE — 3331090001 HH PPS REVENUE CREDIT

## 2018-01-12 PROCEDURE — G0151 HHCP-SERV OF PT,EA 15 MIN: HCPCS

## 2018-01-12 PROCEDURE — G0152 HHCP-SERV OF OT,EA 15 MIN: HCPCS

## 2018-01-13 PROCEDURE — 3331090002 HH PPS REVENUE DEBIT

## 2018-01-13 PROCEDURE — 3331090001 HH PPS REVENUE CREDIT

## 2018-01-14 VITALS — RESPIRATION RATE: 18 BRPM | DIASTOLIC BLOOD PRESSURE: 56 MMHG | HEART RATE: 78 BPM | SYSTOLIC BLOOD PRESSURE: 94 MMHG

## 2018-01-14 PROCEDURE — 3331090001 HH PPS REVENUE CREDIT

## 2018-01-14 PROCEDURE — 3331090002 HH PPS REVENUE DEBIT

## 2018-01-15 PROCEDURE — 3331090002 HH PPS REVENUE DEBIT

## 2018-01-15 PROCEDURE — 3331090001 HH PPS REVENUE CREDIT

## 2018-01-16 ENCOUNTER — HOME CARE VISIT (OUTPATIENT)
Dept: SCHEDULING | Facility: HOME HEALTH | Age: 68
End: 2018-01-16
Payer: MEDICARE

## 2018-01-16 VITALS — SYSTOLIC BLOOD PRESSURE: 110 MMHG | HEART RATE: 74 BPM | RESPIRATION RATE: 18 BRPM | DIASTOLIC BLOOD PRESSURE: 70 MMHG

## 2018-01-16 PROCEDURE — 3331090001 HH PPS REVENUE CREDIT

## 2018-01-16 PROCEDURE — G0151 HHCP-SERV OF PT,EA 15 MIN: HCPCS

## 2018-01-16 PROCEDURE — 3331090002 HH PPS REVENUE DEBIT

## 2018-01-17 PROCEDURE — 3331090001 HH PPS REVENUE CREDIT

## 2018-01-17 PROCEDURE — 3331090002 HH PPS REVENUE DEBIT

## 2018-01-18 ENCOUNTER — HOME CARE VISIT (OUTPATIENT)
Dept: SCHEDULING | Facility: HOME HEALTH | Age: 68
End: 2018-01-18
Payer: MEDICARE

## 2018-01-18 VITALS — RESPIRATION RATE: 18 BRPM | HEART RATE: 68 BPM | DIASTOLIC BLOOD PRESSURE: 62 MMHG | SYSTOLIC BLOOD PRESSURE: 112 MMHG

## 2018-01-18 PROCEDURE — 3331090003 HH PPS REVENUE ADJ

## 2018-01-18 PROCEDURE — G0151 HHCP-SERV OF PT,EA 15 MIN: HCPCS

## 2018-01-18 PROCEDURE — 3331090002 HH PPS REVENUE DEBIT

## 2018-01-18 PROCEDURE — 3331090001 HH PPS REVENUE CREDIT

## 2018-01-19 PROCEDURE — 3331090002 HH PPS REVENUE DEBIT

## 2018-01-19 PROCEDURE — 3331090001 HH PPS REVENUE CREDIT

## 2018-01-20 PROCEDURE — 3331090001 HH PPS REVENUE CREDIT

## 2018-01-20 PROCEDURE — 3331090002 HH PPS REVENUE DEBIT

## 2018-01-21 PROCEDURE — 3331090002 HH PPS REVENUE DEBIT

## 2018-01-21 PROCEDURE — 3331090001 HH PPS REVENUE CREDIT

## 2018-01-22 PROCEDURE — 3331090001 HH PPS REVENUE CREDIT

## 2018-01-22 PROCEDURE — 3331090002 HH PPS REVENUE DEBIT

## 2018-01-23 ENCOUNTER — PATIENT OUTREACH (OUTPATIENT)
Dept: CASE MANAGEMENT | Age: 68
End: 2018-01-23

## 2018-01-23 NOTE — PROGRESS NOTES
YOBANY follow up call. Patient reports she has completed HH PT/OT. She plans to discuss outpatient OT with ortho at appointment 1/24/18. Ortho appointment rescheduled due to inclement weather 1/17/18. Patient attended PCP follow up with Dr. Ellsworth 1/19/18. Will follow up per YOBANY protocol. This note will not be viewable in [address]

## 2018-02-06 ENCOUNTER — PATIENT OUTREACH (OUTPATIENT)
Dept: CASE MANAGEMENT | Age: 68
End: 2018-02-06

## 2018-02-06 NOTE — PROGRESS NOTES
YOBANY follow up call. Unable to reach patient. Message left requesting a return call. This note will not be viewable in [address]

## 2018-02-13 ENCOUNTER — PATIENT OUTREACH (OUTPATIENT)
Dept: CASE MANAGEMENT | Age: 68
End: 2018-02-13

## 2018-02-13 NOTE — PROGRESS NOTES
YOBANY follow up call. Patient reports doing well. Attending outpatient PT twice weekly. Scheduled for follow up with ortho 2/14/18. Reports no issues/concerns. Contact information provided for any future issues/concerns. Episode resolved. This note will not be viewable in [address]

## (undated) DEVICE — SUTURE 5 MERS GRN 30 TO 40 IN D9211

## (undated) DEVICE — SYRINGE CATH TIP 50ML

## (undated) DEVICE — SUTURE PERMAHAND SZ 2-0 L12X18IN NONABSORBABLE BLK SILK A185H

## (undated) DEVICE — ARGYLE SIGMOID SURGICAL SUCTION INSTRUMENT 23 FR/CH (7.7 MM): Brand: ARGYLE

## (undated) DEVICE — BANDAGE COMPR SELF ADH 5 YDX4 IN TAN STRL PREMIERPRO LF

## (undated) DEVICE — AMD ANTIMICROBIAL BANDAGE ROLL,6 PLY: Brand: KERLIX

## (undated) DEVICE — Device

## (undated) DEVICE — ELECTRODE NDL 2.8IN COAT VALLEYLAB

## (undated) DEVICE — DRAPE,TOP,102X53,STERILE: Brand: MEDLINE

## (undated) DEVICE — BUTTON SWITCH PENCIL BLADE ELECTRODE, HOLSTER: Brand: EDGE

## (undated) DEVICE — REM POLYHESIVE ADULT PATIENT RETURN ELECTRODE: Brand: VALLEYLAB

## (undated) DEVICE — FAN SPRAY KIT: Brand: PULSAVAC®

## (undated) DEVICE — SOLUTION IRRIG 1000ML H2O STRL BLT

## (undated) DEVICE — DISPOSABLE DRAPE, STERILE, FOR A CDS-3060 5 FOOT TABLE: Brand: PEDIGO PRODUCTS, INC.

## (undated) DEVICE — SUTURE ETHBND EXCEL SZ 2 L27IN NONABSORBABLE GRN WHT MO-7 D7485

## (undated) DEVICE — OSCILLATING TIP SAW CARTRIDGE: Brand: STRYKER PRECISION

## (undated) DEVICE — 3000CC GUARDIAN II: Brand: GUARDIAN

## (undated) DEVICE — SUTURE PROL SZ 2-0 L18IN NONABSORBABLE BLU FS L26MM 3/8 CIR 8685H

## (undated) DEVICE — SOLUTION IRRIG 3000ML 0.9% SOD CHL FLX CONT 0797208] ICU MEDICAL INC]

## (undated) DEVICE — KENDALL SCD EXPRESS SLEEVES, KNEE LENGTH, MEDIUM: Brand: KENDALL SCD

## (undated) DEVICE — GOWN,REINFORCED,POLY,AURORA,XXLARGE,STR: Brand: MEDLINE

## (undated) DEVICE — SUTURE VCRL SZ 0 L27IN ABSRB UD L36MM CP-1 1/2 CIR REV CUT J267H

## (undated) DEVICE — PACKING WND W1INXL6FT VAG WVN COT GZ RADPQ

## (undated) DEVICE — MEDI-VAC YANKAUER SUCTION HANDLE W/BULBOUS TIP: Brand: CARDINAL HEALTH

## (undated) DEVICE — SHEET, DRAPE, SPLIT, STERILE: Brand: MEDLINE

## (undated) DEVICE — SURGICAL PROCEDURE PACK BASIC ST FRANCIS

## (undated) DEVICE — COVER,MAYO STAND,STERILE: Brand: MEDLINE

## (undated) DEVICE — GUIDEPIN ORTH L300MM DIA1.5MM GLENOSPHERE FOR DELT XTEND

## (undated) DEVICE — GUIDEPIN ORTH L190MM DIA2.5MM METAGLENE CTRL FOR DELT XTEND

## (undated) DEVICE — TRAY CATH 16F DRN BG LTX -- CONVERT TO ITEM 363158

## (undated) DEVICE — BLADE SAW W19.5XL71MM THK1.2MM OSC L BONE

## (undated) DEVICE — PAD,ABDOMINAL,5"X9",STERILE,LF,1/PK: Brand: MEDLINE INDUSTRIES, INC.

## (undated) DEVICE — (D)STRIP SKN CLSR 0.5X4IN WHT --

## (undated) DEVICE — STOCKINETTE TUBE 6X48 -- MEDICHOICE

## (undated) DEVICE — (D)PREP SKN CHLRAPRP APPL 26ML -- CONVERT TO ITEM 371833

## (undated) DEVICE — 3M™ COBAN™ NL STERILE NON-LATEX SELF-ADHERENT WRAP, 2084S, 4 IN X 5 YD (10 CM X 4,5 M), 18 ROLLS/CASE: Brand: 3M™ COBAN™

## (undated) DEVICE — SPONGE LAP 18X18IN STRL -- 5/PK

## (undated) DEVICE — DRAPE TWL SURG 16X26IN BLU ORB04] ALLCARE INC]

## (undated) DEVICE — SINGLE BASIN: Brand: CARDINAL HEALTH

## (undated) DEVICE — SCREW BNE L18MM DIA4.5MM SHLDR TI NONLOCKING FULL THRD FOR: Type: IMPLANTABLE DEVICE | Site: SHOULDER | Status: NON-FUNCTIONAL